# Patient Record
Sex: FEMALE | Race: WHITE | NOT HISPANIC OR LATINO | Employment: FULL TIME | URBAN - METROPOLITAN AREA
[De-identification: names, ages, dates, MRNs, and addresses within clinical notes are randomized per-mention and may not be internally consistent; named-entity substitution may affect disease eponyms.]

---

## 2020-05-13 ENCOUNTER — HOSPITAL ENCOUNTER (EMERGENCY)
Facility: HOSPITAL | Age: 40
Discharge: HOME | End: 2020-05-13
Attending: EMERGENCY MEDICINE | Admitting: EMERGENCY MEDICINE

## 2020-05-13 ENCOUNTER — APPOINTMENT (OUTPATIENT)
Dept: CT IMAGING | Facility: HOSPITAL | Age: 40
End: 2020-05-13

## 2020-05-13 VITALS
RESPIRATION RATE: 13 BRPM | OXYGEN SATURATION: 100 % | HEIGHT: 65 IN | DIASTOLIC BLOOD PRESSURE: 68 MMHG | HEART RATE: 55 BPM | WEIGHT: 149.91 LBS | BODY MASS INDEX: 24.98 KG/M2 | SYSTOLIC BLOOD PRESSURE: 110 MMHG | TEMPERATURE: 97.5 F

## 2020-05-13 DIAGNOSIS — N20.1 URETEROLITHIASIS: Primary | ICD-10-CM

## 2020-05-13 DIAGNOSIS — R10.9 FLANK PAIN: ICD-10-CM

## 2020-05-13 LAB
ALBUMIN SERPL-MCNC: 4.7 G/DL (ref 3.5–5.2)
ALBUMIN/GLOB SERPL: 1.9 G/DL
ALP SERPL-CCNC: 70 U/L (ref 39–117)
ALT SERPL W P-5'-P-CCNC: 11 U/L (ref 1–33)
ANION GAP SERPL CALCULATED.3IONS-SCNC: 14 MMOL/L (ref 5–15)
AST SERPL-CCNC: 20 U/L (ref 1–32)
B-HCG UR QL: NEGATIVE
BACTERIA UR QL AUTO: ABNORMAL /HPF
BASOPHILS # BLD AUTO: 0 10*3/MM3 (ref 0–0.2)
BASOPHILS NFR BLD AUTO: 0.2 % (ref 0–1.5)
BILIRUB SERPL-MCNC: 0.3 MG/DL (ref 0.2–1.2)
BILIRUB UR QL STRIP: NEGATIVE
BUN BLD-MCNC: 19 MG/DL (ref 6–20)
BUN/CREAT SERPL: 21.6 (ref 7–25)
CALCIUM SPEC-SCNC: 9.3 MG/DL (ref 8.6–10.5)
CHLORIDE SERPL-SCNC: 105 MMOL/L (ref 98–107)
CLARITY UR: ABNORMAL
CO2 SERPL-SCNC: 22 MMOL/L (ref 22–29)
COLOR UR: ABNORMAL
CREAT BLD-MCNC: 0.88 MG/DL (ref 0.57–1)
DEPRECATED RDW RBC AUTO: 41.6 FL (ref 37–54)
EOSINOPHIL # BLD AUTO: 0.1 10*3/MM3 (ref 0–0.4)
EOSINOPHIL NFR BLD AUTO: 0.9 % (ref 0.3–6.2)
ERYTHROCYTE [DISTWIDTH] IN BLOOD BY AUTOMATED COUNT: 13 % (ref 12.3–15.4)
GFR SERPL CREATININE-BSD FRML MDRD: 71 ML/MIN/1.73
GLOBULIN UR ELPH-MCNC: 2.5 GM/DL
GLUCOSE BLD-MCNC: 111 MG/DL (ref 65–99)
GLUCOSE UR STRIP-MCNC: NEGATIVE MG/DL
HCT VFR BLD AUTO: 39.8 % (ref 34–46.6)
HGB BLD-MCNC: 14.3 G/DL (ref 12–15.9)
HGB UR QL STRIP.AUTO: ABNORMAL
HOLD SPECIMEN: NORMAL
HYALINE CASTS UR QL AUTO: ABNORMAL /LPF
KETONES UR QL STRIP: NEGATIVE
LEUKOCYTE ESTERASE UR QL STRIP.AUTO: ABNORMAL
LIPASE SERPL-CCNC: 24 U/L (ref 13–60)
LYMPHOCYTES # BLD AUTO: 2 10*3/MM3 (ref 0.7–3.1)
LYMPHOCYTES NFR BLD AUTO: 21 % (ref 19.6–45.3)
MCH RBC QN AUTO: 32.5 PG (ref 26.6–33)
MCHC RBC AUTO-ENTMCNC: 35.9 G/DL (ref 31.5–35.7)
MCV RBC AUTO: 90.6 FL (ref 79–97)
MONOCYTES # BLD AUTO: 0.5 10*3/MM3 (ref 0.1–0.9)
MONOCYTES NFR BLD AUTO: 5.6 % (ref 5–12)
NEUTROPHILS # BLD AUTO: 7 10*3/MM3 (ref 1.7–7)
NEUTROPHILS NFR BLD AUTO: 72.3 % (ref 42.7–76)
NITRITE UR QL STRIP: NEGATIVE
NRBC BLD AUTO-RTO: 0 /100 WBC (ref 0–0.2)
PH UR STRIP.AUTO: 6.5 [PH] (ref 5–8)
PLATELET # BLD AUTO: 197 10*3/MM3 (ref 140–450)
PMV BLD AUTO: 7.7 FL (ref 6–12)
POTASSIUM BLD-SCNC: 3.3 MMOL/L (ref 3.5–5.2)
PROT SERPL-MCNC: 7.2 G/DL (ref 6–8.5)
PROT UR QL STRIP: ABNORMAL
RBC # BLD AUTO: 4.4 10*6/MM3 (ref 3.77–5.28)
RBC # UR: ABNORMAL /HPF
REF LAB TEST METHOD: ABNORMAL
SODIUM BLD-SCNC: 141 MMOL/L (ref 136–145)
SP GR UR STRIP: 1.02 (ref 1–1.03)
SQUAMOUS #/AREA URNS HPF: ABNORMAL /HPF
UROBILINOGEN UR QL STRIP: ABNORMAL
WBC NRBC COR # BLD: 9.7 10*3/MM3 (ref 3.4–10.8)
WBC UR QL AUTO: ABNORMAL /HPF
WHOLE BLOOD HOLD SPECIMEN: NORMAL

## 2020-05-13 PROCEDURE — 25010000002 ONDANSETRON PER 1 MG: Performed by: EMERGENCY MEDICINE

## 2020-05-13 PROCEDURE — 81025 URINE PREGNANCY TEST: CPT | Performed by: EMERGENCY MEDICINE

## 2020-05-13 PROCEDURE — 85025 COMPLETE CBC W/AUTO DIFF WBC: CPT | Performed by: EMERGENCY MEDICINE

## 2020-05-13 PROCEDURE — 96375 TX/PRO/DX INJ NEW DRUG ADDON: CPT

## 2020-05-13 PROCEDURE — 80053 COMPREHEN METABOLIC PANEL: CPT | Performed by: EMERGENCY MEDICINE

## 2020-05-13 PROCEDURE — 74176 CT ABD & PELVIS W/O CONTRAST: CPT

## 2020-05-13 PROCEDURE — 96374 THER/PROPH/DIAG INJ IV PUSH: CPT

## 2020-05-13 PROCEDURE — 83690 ASSAY OF LIPASE: CPT | Performed by: EMERGENCY MEDICINE

## 2020-05-13 PROCEDURE — 81001 URINALYSIS AUTO W/SCOPE: CPT | Performed by: EMERGENCY MEDICINE

## 2020-05-13 PROCEDURE — 99284 EMERGENCY DEPT VISIT MOD MDM: CPT

## 2020-05-13 PROCEDURE — 25010000002 HYDROMORPHONE PER 4 MG: Performed by: EMERGENCY MEDICINE

## 2020-05-13 PROCEDURE — 25010000002 KETOROLAC TROMETHAMINE PER 15 MG: Performed by: EMERGENCY MEDICINE

## 2020-05-13 RX ORDER — SODIUM CHLORIDE 0.9 % (FLUSH) 0.9 %
10 SYRINGE (ML) INJECTION AS NEEDED
Status: DISCONTINUED | OUTPATIENT
Start: 2020-05-13 | End: 2020-05-14 | Stop reason: HOSPADM

## 2020-05-13 RX ORDER — ONDANSETRON 2 MG/ML
4 INJECTION INTRAMUSCULAR; INTRAVENOUS ONCE
Status: COMPLETED | OUTPATIENT
Start: 2020-05-13 | End: 2020-05-13

## 2020-05-13 RX ORDER — HYDROCODONE BITARTRATE AND ACETAMINOPHEN 7.5; 325 MG/1; MG/1
1 TABLET ORAL EVERY 4 HOURS PRN
Qty: 10 TABLET | Refills: 0 | Status: ON HOLD | OUTPATIENT
Start: 2020-05-13 | End: 2020-05-18 | Stop reason: SDUPTHER

## 2020-05-13 RX ORDER — CEFDINIR 300 MG/1
300 CAPSULE ORAL 2 TIMES DAILY
Qty: 14 CAPSULE | Refills: 0 | Status: SHIPPED | OUTPATIENT
Start: 2020-05-13 | End: 2020-05-18 | Stop reason: HOSPADM

## 2020-05-13 RX ORDER — KETOROLAC TROMETHAMINE 15 MG/ML
15 INJECTION, SOLUTION INTRAMUSCULAR; INTRAVENOUS ONCE
Status: COMPLETED | OUTPATIENT
Start: 2020-05-13 | End: 2020-05-13

## 2020-05-13 RX ORDER — TAMSULOSIN HYDROCHLORIDE 0.4 MG/1
1 CAPSULE ORAL NIGHTLY
Qty: 10 CAPSULE | Refills: 0 | Status: SHIPPED | OUTPATIENT
Start: 2020-05-13 | End: 2020-05-18 | Stop reason: HOSPADM

## 2020-05-13 RX ORDER — SODIUM CHLORIDE 9 MG/ML
500 INJECTION, SOLUTION INTRAVENOUS CONTINUOUS
Status: DISCONTINUED | OUTPATIENT
Start: 2020-05-13 | End: 2020-05-14 | Stop reason: HOSPADM

## 2020-05-13 RX ORDER — HYDROMORPHONE HCL 110MG/55ML
0.5 PATIENT CONTROLLED ANALGESIA SYRINGE INTRAVENOUS ONCE
Status: COMPLETED | OUTPATIENT
Start: 2020-05-13 | End: 2020-05-13

## 2020-05-13 RX ORDER — ONDANSETRON 4 MG/1
4 TABLET, ORALLY DISINTEGRATING ORAL 4 TIMES DAILY PRN
Qty: 15 TABLET | Refills: 0 | Status: SHIPPED | OUTPATIENT
Start: 2020-05-13

## 2020-05-13 RX ADMIN — SODIUM CHLORIDE 500 ML/HR: 900 INJECTION, SOLUTION INTRAVENOUS at 21:37

## 2020-05-13 RX ADMIN — KETOROLAC TROMETHAMINE 15 MG: 15 INJECTION, SOLUTION INTRAMUSCULAR; INTRAVENOUS at 20:27

## 2020-05-13 RX ADMIN — HYDROMORPHONE HYDROCHLORIDE 0.5 MG: 2 INJECTION, SOLUTION INTRAMUSCULAR; INTRAVENOUS; SUBCUTANEOUS at 20:27

## 2020-05-13 RX ADMIN — ONDANSETRON 4 MG: 2 INJECTION INTRAMUSCULAR; INTRAVENOUS at 20:27

## 2020-05-14 NOTE — ED NOTES
Pt scheduled for Esophageal Motility study Pt stated this morning she began having pain starting from her lower flank area radiating to her abdomin. Pt denies any vomiting. Patient stated she's having 9/10 in pain.     Varinder Pereira, RN  05/13/20 2010

## 2020-05-14 NOTE — DISCHARGE INSTRUCTIONS
Rest, drink plenty of fluids, strain urine, return promptly for increased pain, fever, persistent vomiting or any other concerns.  Call urology in the morning to schedule follow-up appointment

## 2020-05-14 NOTE — ED NOTES
Pt stated her pain had subsided. Pt tolerated medications well.     Varinder Pereira, RN  05/13/20 1339

## 2020-05-14 NOTE — ED PROVIDER NOTES
"Subjective   History of Present Illness  Right flank pain  40-year-old female complains of right flank pain that started this morning and has progressed in intensity throughout the day.  She states she has radiating pain from the flank to the right groin.  She has had some difficulty urinating today.  She reports no fevers or chills she has had nausea.  States she has had a kidney stone in the past and this feels similar.  She denies trauma or any relieving or exacerbating factors.  Review of Systems   Constitutional: Negative.    HENT: Negative.    Eyes: Negative.    Respiratory: Negative.    Cardiovascular: Negative.    Gastrointestinal: Positive for abdominal pain and nausea.   Genitourinary: Positive for difficulty urinating and flank pain.   Musculoskeletal: Positive for back pain.   Skin: Negative.    Neurological: Negative.    Hematological: Negative.    Psychiatric/Behavioral: Negative.        History reviewed. No pertinent past medical history.  Kidney stone  No Known Allergies    History reviewed. No pertinent surgical history.    No family history on file.    Social History     Socioeconomic History   • Marital status:      Spouse name: Not on file   • Number of children: Not on file   • Years of education: Not on file   • Highest education level: Not on file       Prior to Admission medications    Not on File     /67 (BP Location: Right arm, Patient Position: Sitting)   Pulse 54   Temp 97.6 °F (36.4 °C) (Oral)   Resp 15   Ht 165.1 cm (65\")   Wt 68 kg (149 lb 14.6 oz)   SpO2 100%   Breastfeeding No   BMI 24.95 kg/m²   I examined the patient using the appropriate personal protective equipment.        Objective   Physical Exam  General: Well-developed well-appearing, no acute distress, alert and appropriate  Eyes: Pupils round, sclera nonicteric  HEENT: Mucous membranes moist, no mucosal swelling  Neck: Supple, no nuchal rigidity, no lymphadenopathy  Respirations: Respirations " nonlabored, equal breath sounds bilaterally, clear lungs  Heart regular rate and rhythm   Abdomen soft, mildly tender uppercase right hemiabdomen, no rebound or guarding, nondistended, no hepatosplenomegaly, no hernia, no mass, normal bowel sounds, right CVA tenderness  Extremities no clubbing cyanosis or edema, calves are symmetric and nontender  Neuro cranial nerves grossly intact, no focal limb deficits  Psych oriented, pleasant affect  Skin no rash, brisk cap refill  Procedures           ED Course      Results for orders placed or performed during the hospital encounter of 05/13/20   Comprehensive Metabolic Panel   Result Value Ref Range    Glucose 111 (H) 65 - 99 mg/dL    BUN 19 6 - 20 mg/dL    Creatinine 0.88 0.57 - 1.00 mg/dL    Sodium 141 136 - 145 mmol/L    Potassium 3.3 (L) 3.5 - 5.2 mmol/L    Chloride 105 98 - 107 mmol/L    CO2 22.0 22.0 - 29.0 mmol/L    Calcium 9.3 8.6 - 10.5 mg/dL    Total Protein 7.2 6.0 - 8.5 g/dL    Albumin 4.70 3.50 - 5.20 g/dL    ALT (SGPT) 11 1 - 33 U/L    AST (SGOT) 20 1 - 32 U/L    Alkaline Phosphatase 70 39 - 117 U/L    Total Bilirubin 0.3 0.2 - 1.2 mg/dL    eGFR Non African Amer 71 >60 mL/min/1.73    Globulin 2.5 gm/dL    A/G Ratio 1.9 g/dL    BUN/Creatinine Ratio 21.6 7.0 - 25.0    Anion Gap 14.0 5.0 - 15.0 mmol/L   Lipase   Result Value Ref Range    Lipase 24 13 - 60 U/L   Pregnancy, Urine - Urine, Clean Catch   Result Value Ref Range    HCG, Urine QL Negative Negative   Urinalysis With Culture If Indicated - Urine, Clean Catch   Result Value Ref Range    Color, UA Dark Yellow (A) Yellow, Straw    Appearance, UA Cloudy (A) Clear    pH, UA 6.5 5.0 - 8.0    Specific Gravity, UA 1.020 1.005 - 1.030    Glucose, UA Negative Negative    Ketones, UA Negative Negative    Bilirubin, UA Negative Negative    Blood, UA Large (3+) (A) Negative    Protein, UA Trace (A) Negative    Leuk Esterase, UA Small (1+) (A) Negative    Nitrite, UA Negative Negative    Urobilinogen, UA 0.2 E.U./dL  0.2 - 1.0 E.U./dL   CBC Auto Differential   Result Value Ref Range    WBC 9.70 3.40 - 10.80 10*3/mm3    RBC 4.40 3.77 - 5.28 10*6/mm3    Hemoglobin 14.3 12.0 - 15.9 g/dL    Hematocrit 39.8 34.0 - 46.6 %    MCV 90.6 79.0 - 97.0 fL    MCH 32.5 26.6 - 33.0 pg    MCHC 35.9 (H) 31.5 - 35.7 g/dL    RDW 13.0 12.3 - 15.4 %    RDW-SD 41.6 37.0 - 54.0 fl    MPV 7.7 6.0 - 12.0 fL    Platelets 197 140 - 450 10*3/mm3    Neutrophil % 72.3 42.7 - 76.0 %    Lymphocyte % 21.0 19.6 - 45.3 %    Monocyte % 5.6 5.0 - 12.0 %    Eosinophil % 0.9 0.3 - 6.2 %    Basophil % 0.2 0.0 - 1.5 %    Neutrophils, Absolute 7.00 1.70 - 7.00 10*3/mm3    Lymphocytes, Absolute 2.00 0.70 - 3.10 10*3/mm3    Monocytes, Absolute 0.50 0.10 - 0.90 10*3/mm3    Eosinophils, Absolute 0.10 0.00 - 0.40 10*3/mm3    Basophils, Absolute 0.00 0.00 - 0.20 10*3/mm3    nRBC 0.0 0.0 - 0.2 /100 WBC   Urinalysis, Microscopic Only - Urine, Clean Catch   Result Value Ref Range    RBC, UA Too Numerous to Count (A) None Seen /HPF    WBC, UA 0-2 (A) None Seen /HPF    Bacteria, UA None Seen None Seen /HPF    Squamous Epithelial Cells, UA 3-6 (A) None Seen, 0-2 /HPF    Hyaline Casts, UA None Seen None Seen /LPF    Methodology Manual Light Microscopy      Ct Abdomen Pelvis Without Contrast    Result Date: 5/13/2020  1.Numerous bilateral renal calculi with moderate right hydronephrosis and hydroureter. The ureters dilated into the pelvis, but evaluation of the distal ureters is severely limited due to artifact from patient's bilateral hip arthroplasties. Several calcifications are seen in the right pelvis, one of which is presumed to be related to an obstructing calculus given the above findings. 2.No left hydronephrosis. There is a suspected left renal cyst. 3.Mild prominence of retroperitoneal lymph nodes, nonspecific. 4.No other definite acute abnormality is seen on this limited noncontrast study.    Electronically Signed By-DR. Kory Melgar MD On:5/13/2020 9:23 PM This report  was finalized on [id number] by DR. Kory Melgar MD.                                         MDM  Patient presents with right flank pain suggestive of renal colic.  She does have findings consistent with ureteral lithiasis and some obstructive uropathy.  However kidney function is normal.  She responded well to analgesia and was resting more comfortably on reexamination.  She was advised the finding as well as the limitation to CT scan due to the artifact.  She is referred to urology.  She was given warning signs for return.  She was prescribed Flomax and Norco Zofran and Omnicef.  Inspect report ordered.  Final diagnoses:   Ureterolithiasis   Flank pain            Roc Charles MD  05/13/20 2127

## 2020-05-17 ENCOUNTER — HOSPITAL ENCOUNTER (OUTPATIENT)
Facility: HOSPITAL | Age: 40
Discharge: HOME | End: 2020-05-18
Attending: INTERNAL MEDICINE | Admitting: UROLOGY

## 2020-05-17 ENCOUNTER — ANESTHESIA EVENT (OUTPATIENT)
Dept: PERIOP | Facility: HOSPITAL | Age: 40
End: 2020-05-17

## 2020-05-17 ENCOUNTER — ANESTHESIA (OUTPATIENT)
Dept: PERIOP | Facility: HOSPITAL | Age: 40
End: 2020-05-17

## 2020-05-17 DIAGNOSIS — N20.1 URETEROLITHIASIS: Primary | ICD-10-CM

## 2020-05-17 DIAGNOSIS — R10.9 FLANK PAIN: ICD-10-CM

## 2020-05-17 DIAGNOSIS — N13.30 HYDROURETERONEPHROSIS: ICD-10-CM

## 2020-05-17 DIAGNOSIS — N20.1 URETERAL STONE: ICD-10-CM

## 2020-05-17 DIAGNOSIS — R11.2 NON-INTRACTABLE VOMITING WITH NAUSEA, UNSPECIFIED VOMITING TYPE: ICD-10-CM

## 2020-05-17 DIAGNOSIS — R10.9 RIGHT FLANK PAIN: ICD-10-CM

## 2020-05-17 LAB
ANION GAP SERPL CALCULATED.3IONS-SCNC: 14 MMOL/L (ref 5–15)
BACTERIA UR QL AUTO: ABNORMAL /HPF
BASOPHILS # BLD AUTO: 0 10*3/MM3 (ref 0–0.2)
BASOPHILS NFR BLD AUTO: 0.2 % (ref 0–1.5)
BILIRUB UR QL STRIP: NEGATIVE
BUN BLD-MCNC: 16 MG/DL (ref 6–20)
BUN/CREAT SERPL: 12.5 (ref 7–25)
CALCIUM SPEC-SCNC: 9 MG/DL (ref 8.6–10.5)
CHLORIDE SERPL-SCNC: 101 MMOL/L (ref 98–107)
CLARITY UR: ABNORMAL
CO2 SERPL-SCNC: 21 MMOL/L (ref 22–29)
COLOR UR: YELLOW
CREAT BLD-MCNC: 1.28 MG/DL (ref 0.57–1)
DEPRECATED RDW RBC AUTO: 40.7 FL (ref 37–54)
EOSINOPHIL # BLD AUTO: 0.1 10*3/MM3 (ref 0–0.4)
EOSINOPHIL NFR BLD AUTO: 0.9 % (ref 0.3–6.2)
ERYTHROCYTE [DISTWIDTH] IN BLOOD BY AUTOMATED COUNT: 12.9 % (ref 12.3–15.4)
GFR SERPL CREATININE-BSD FRML MDRD: 46 ML/MIN/1.73
GLUCOSE BLD-MCNC: 106 MG/DL (ref 65–99)
GLUCOSE UR STRIP-MCNC: NEGATIVE MG/DL
HCT VFR BLD AUTO: 37.3 % (ref 34–46.6)
HGB BLD-MCNC: 13.2 G/DL (ref 12–15.9)
HGB UR QL STRIP.AUTO: ABNORMAL
HYALINE CASTS UR QL AUTO: ABNORMAL /LPF
KETONES UR QL STRIP: ABNORMAL
LEUKOCYTE ESTERASE UR QL STRIP.AUTO: ABNORMAL
LYMPHOCYTES # BLD AUTO: 0.9 10*3/MM3 (ref 0.7–3.1)
LYMPHOCYTES NFR BLD AUTO: 10.7 % (ref 19.6–45.3)
MCH RBC QN AUTO: 32.2 PG (ref 26.6–33)
MCHC RBC AUTO-ENTMCNC: 35.4 G/DL (ref 31.5–35.7)
MCV RBC AUTO: 90.7 FL (ref 79–97)
MONOCYTES # BLD AUTO: 0.7 10*3/MM3 (ref 0.1–0.9)
MONOCYTES NFR BLD AUTO: 9.3 % (ref 5–12)
NEUTROPHILS # BLD AUTO: 6.3 10*3/MM3 (ref 1.7–7)
NEUTROPHILS NFR BLD AUTO: 78.9 % (ref 42.7–76)
NITRITE UR QL STRIP: NEGATIVE
NRBC BLD AUTO-RTO: 0 /100 WBC (ref 0–0.2)
PH UR STRIP.AUTO: 6.5 [PH] (ref 5–8)
PLATELET # BLD AUTO: 183 10*3/MM3 (ref 140–450)
PMV BLD AUTO: 8.1 FL (ref 6–12)
POTASSIUM BLD-SCNC: 3.5 MMOL/L (ref 3.5–5.2)
PROT UR QL STRIP: NEGATIVE
RBC # BLD AUTO: 4.11 10*6/MM3 (ref 3.77–5.28)
RBC # UR: ABNORMAL /HPF
REF LAB TEST METHOD: ABNORMAL
SODIUM BLD-SCNC: 136 MMOL/L (ref 136–145)
SP GR UR STRIP: 1.02 (ref 1–1.03)
SQUAMOUS #/AREA URNS HPF: ABNORMAL /HPF
UROBILINOGEN UR QL STRIP: ABNORMAL
WBC NRBC COR # BLD: 8 10*3/MM3 (ref 3.4–10.8)
WBC UR QL AUTO: ABNORMAL /HPF

## 2020-05-17 PROCEDURE — G0378 HOSPITAL OBSERVATION PER HR: HCPCS

## 2020-05-17 PROCEDURE — C1769 GUIDE WIRE: HCPCS | Performed by: UROLOGY

## 2020-05-17 PROCEDURE — 99220 PR INITIAL OBSERVATION CARE/DAY 70 MINUTES: CPT | Performed by: INTERNAL MEDICINE

## 2020-05-17 PROCEDURE — 25010000003 MEPERIDINE PER 100 MG: Performed by: ANESTHESIOLOGY

## 2020-05-17 PROCEDURE — 96375 TX/PRO/DX INJ NEW DRUG ADDON: CPT

## 2020-05-17 PROCEDURE — C1758 CATHETER, URETERAL: HCPCS | Performed by: UROLOGY

## 2020-05-17 PROCEDURE — 80048 BASIC METABOLIC PNL TOTAL CA: CPT | Performed by: NURSE PRACTITIONER

## 2020-05-17 PROCEDURE — 25010000002 MORPHINE PER 10 MG: Performed by: NURSE PRACTITIONER

## 2020-05-17 PROCEDURE — C2617 STENT, NON-COR, TEM W/O DEL: HCPCS | Performed by: UROLOGY

## 2020-05-17 PROCEDURE — 99284 EMERGENCY DEPT VISIT MOD MDM: CPT

## 2020-05-17 PROCEDURE — 87086 URINE CULTURE/COLONY COUNT: CPT | Performed by: NURSE PRACTITIONER

## 2020-05-17 PROCEDURE — 25010000002 PROPOFOL 200 MG/20ML EMULSION: Performed by: ANESTHESIOLOGY

## 2020-05-17 PROCEDURE — 25010000002 KETOROLAC TROMETHAMINE PER 15 MG: Performed by: NURSE PRACTITIONER

## 2020-05-17 PROCEDURE — 96374 THER/PROPH/DIAG INJ IV PUSH: CPT

## 2020-05-17 PROCEDURE — 81001 URINALYSIS AUTO W/SCOPE: CPT | Performed by: NURSE PRACTITIONER

## 2020-05-17 PROCEDURE — 25010000002 FENTANYL CITRATE (PF) 100 MCG/2ML SOLUTION: Performed by: ANESTHESIOLOGY

## 2020-05-17 PROCEDURE — 85025 COMPLETE CBC W/AUTO DIFF WBC: CPT | Performed by: NURSE PRACTITIONER

## 2020-05-17 PROCEDURE — 82365 CALCULUS SPECTROSCOPY: CPT | Performed by: UROLOGY

## 2020-05-17 PROCEDURE — 25010000002 MIDAZOLAM PER 1 MG: Performed by: ANESTHESIOLOGY

## 2020-05-17 PROCEDURE — 25010000002 ONDANSETRON PER 1 MG: Performed by: NURSE PRACTITIONER

## 2020-05-17 DEVICE — URETERAL STENT
Type: IMPLANTABLE DEVICE | Site: URETER | Status: FUNCTIONAL
Brand: PERCUFLEX™ PLUS

## 2020-05-17 RX ORDER — ACETAMINOPHEN 650 MG/1
650 SUPPOSITORY RECTAL ONCE AS NEEDED
Status: DISCONTINUED | OUTPATIENT
Start: 2020-05-17 | End: 2020-05-17 | Stop reason: HOSPADM

## 2020-05-17 RX ORDER — NALOXONE HCL 0.4 MG/ML
0.1 VIAL (ML) INJECTION
Status: DISCONTINUED | OUTPATIENT
Start: 2020-05-17 | End: 2020-05-18 | Stop reason: HOSPADM

## 2020-05-17 RX ORDER — ONDANSETRON 4 MG/1
4 TABLET, FILM COATED ORAL EVERY 6 HOURS PRN
Status: DISCONTINUED | OUTPATIENT
Start: 2020-05-17 | End: 2020-05-18 | Stop reason: HOSPADM

## 2020-05-17 RX ORDER — ACETAMINOPHEN 325 MG/1
650 TABLET ORAL EVERY 4 HOURS PRN
Status: DISCONTINUED | OUTPATIENT
Start: 2020-05-17 | End: 2020-05-18 | Stop reason: HOSPADM

## 2020-05-17 RX ORDER — SODIUM CHLORIDE 9 MG/ML
100 INJECTION, SOLUTION INTRAVENOUS CONTINUOUS
Status: DISCONTINUED | OUTPATIENT
Start: 2020-05-17 | End: 2020-05-18 | Stop reason: HOSPADM

## 2020-05-17 RX ORDER — ONDANSETRON 2 MG/ML
4 INJECTION INTRAMUSCULAR; INTRAVENOUS ONCE
Status: COMPLETED | OUTPATIENT
Start: 2020-05-17 | End: 2020-05-17

## 2020-05-17 RX ORDER — PROMETHAZINE HYDROCHLORIDE 25 MG/1
25 TABLET ORAL ONCE AS NEEDED
Status: DISCONTINUED | OUTPATIENT
Start: 2020-05-17 | End: 2020-05-17 | Stop reason: HOSPADM

## 2020-05-17 RX ORDER — SODIUM CHLORIDE 0.9 % (FLUSH) 0.9 %
10 SYRINGE (ML) INJECTION AS NEEDED
Status: DISCONTINUED | OUTPATIENT
Start: 2020-05-17 | End: 2020-05-18 | Stop reason: HOSPADM

## 2020-05-17 RX ORDER — SODIUM CHLORIDE 0.9 % (FLUSH) 0.9 %
10 SYRINGE (ML) INJECTION EVERY 12 HOURS SCHEDULED
Status: DISCONTINUED | OUTPATIENT
Start: 2020-05-17 | End: 2020-05-18 | Stop reason: HOSPADM

## 2020-05-17 RX ORDER — MEPERIDINE HYDROCHLORIDE 25 MG/ML
12.5 INJECTION INTRAMUSCULAR; INTRAVENOUS; SUBCUTANEOUS
Status: DISCONTINUED | OUTPATIENT
Start: 2020-05-17 | End: 2020-05-17 | Stop reason: HOSPADM

## 2020-05-17 RX ORDER — HYDROCODONE BITARTRATE AND ACETAMINOPHEN 7.5; 325 MG/1; MG/1
2 TABLET ORAL EVERY 4 HOURS PRN
Status: DISCONTINUED | OUTPATIENT
Start: 2020-05-17 | End: 2020-05-18 | Stop reason: HOSPADM

## 2020-05-17 RX ORDER — KETOROLAC TROMETHAMINE 15 MG/ML
15 INJECTION, SOLUTION INTRAMUSCULAR; INTRAVENOUS ONCE
Status: COMPLETED | OUTPATIENT
Start: 2020-05-17 | End: 2020-05-17

## 2020-05-17 RX ORDER — ATROPA BELLADONNA AND OPIUM 16.2; 3 MG/1; MG/1
30 SUPPOSITORY RECTAL DAILY PRN
Status: DISCONTINUED | OUTPATIENT
Start: 2020-05-17 | End: 2020-05-18 | Stop reason: HOSPADM

## 2020-05-17 RX ORDER — ACETAMINOPHEN 650 MG/1
650 SUPPOSITORY RECTAL EVERY 4 HOURS PRN
Status: DISCONTINUED | OUTPATIENT
Start: 2020-05-17 | End: 2020-05-18 | Stop reason: HOSPADM

## 2020-05-17 RX ORDER — CHOLECALCIFEROL (VITAMIN D3) 125 MCG
5 CAPSULE ORAL NIGHTLY PRN
Status: DISCONTINUED | OUTPATIENT
Start: 2020-05-17 | End: 2020-05-18 | Stop reason: HOSPADM

## 2020-05-17 RX ORDER — PROPOFOL 10 MG/ML
INJECTION, EMULSION INTRAVENOUS AS NEEDED
Status: DISCONTINUED | OUTPATIENT
Start: 2020-05-17 | End: 2020-05-17 | Stop reason: SURG

## 2020-05-17 RX ORDER — ONDANSETRON 2 MG/ML
4 INJECTION INTRAMUSCULAR; INTRAVENOUS EVERY 6 HOURS PRN
Status: DISCONTINUED | OUTPATIENT
Start: 2020-05-17 | End: 2020-05-18 | Stop reason: HOSPADM

## 2020-05-17 RX ORDER — MORPHINE SULFATE 4 MG/ML
4 INJECTION, SOLUTION INTRAMUSCULAR; INTRAVENOUS ONCE
Status: COMPLETED | OUTPATIENT
Start: 2020-05-17 | End: 2020-05-17

## 2020-05-17 RX ORDER — MIDAZOLAM HYDROCHLORIDE 1 MG/ML
INJECTION INTRAMUSCULAR; INTRAVENOUS AS NEEDED
Status: DISCONTINUED | OUTPATIENT
Start: 2020-05-17 | End: 2020-05-17 | Stop reason: SURG

## 2020-05-17 RX ORDER — SODIUM CHLORIDE 9 MG/ML
INJECTION, SOLUTION INTRAVENOUS CONTINUOUS PRN
Status: DISCONTINUED | OUTPATIENT
Start: 2020-05-17 | End: 2020-05-17 | Stop reason: SURG

## 2020-05-17 RX ORDER — HYDROCODONE BITARTRATE AND ACETAMINOPHEN 7.5; 325 MG/1; MG/1
1 TABLET ORAL EVERY 4 HOURS PRN
Status: DISCONTINUED | OUTPATIENT
Start: 2020-05-17 | End: 2020-05-18 | Stop reason: HOSPADM

## 2020-05-17 RX ORDER — MORPHINE SULFATE 4 MG/ML
4 INJECTION, SOLUTION INTRAMUSCULAR; INTRAVENOUS
Status: DISCONTINUED | OUTPATIENT
Start: 2020-05-17 | End: 2020-05-18 | Stop reason: HOSPADM

## 2020-05-17 RX ORDER — ACETAMINOPHEN 325 MG/1
650 TABLET ORAL ONCE AS NEEDED
Status: DISCONTINUED | OUTPATIENT
Start: 2020-05-17 | End: 2020-05-17 | Stop reason: HOSPADM

## 2020-05-17 RX ORDER — KETOROLAC TROMETHAMINE 15 MG/ML
15 INJECTION, SOLUTION INTRAMUSCULAR; INTRAVENOUS EVERY 6 HOURS PRN
Status: DISCONTINUED | OUTPATIENT
Start: 2020-05-17 | End: 2020-05-17

## 2020-05-17 RX ORDER — HYDROCODONE BITARTRATE AND ACETAMINOPHEN 5; 325 MG/1; MG/1
1 TABLET ORAL ONCE AS NEEDED
Status: DISCONTINUED | OUTPATIENT
Start: 2020-05-17 | End: 2020-05-17 | Stop reason: HOSPADM

## 2020-05-17 RX ORDER — MULTIPLE VITAMINS W/ MINERALS TAB 9MG-400MCG
1 TAB ORAL DAILY
COMMUNITY

## 2020-05-17 RX ORDER — PROMETHAZINE HYDROCHLORIDE 25 MG/1
25 SUPPOSITORY RECTAL ONCE AS NEEDED
Status: DISCONTINUED | OUTPATIENT
Start: 2020-05-17 | End: 2020-05-17 | Stop reason: HOSPADM

## 2020-05-17 RX ORDER — PHENAZOPYRIDINE HYDROCHLORIDE 100 MG/1
100 TABLET, FILM COATED ORAL
Status: DISCONTINUED | OUTPATIENT
Start: 2020-05-17 | End: 2020-05-18 | Stop reason: HOSPADM

## 2020-05-17 RX ORDER — SUMATRIPTAN 25 MG/1
25 TABLET, FILM COATED ORAL
Status: DISCONTINUED | OUTPATIENT
Start: 2020-05-17 | End: 2020-05-18 | Stop reason: HOSPADM

## 2020-05-17 RX ORDER — ONDANSETRON 2 MG/ML
4 INJECTION INTRAMUSCULAR; INTRAVENOUS ONCE AS NEEDED
Status: DISCONTINUED | OUTPATIENT
Start: 2020-05-17 | End: 2020-05-17 | Stop reason: HOSPADM

## 2020-05-17 RX ORDER — LORAZEPAM 2 MG/ML
0.5 INJECTION INTRAMUSCULAR
Status: DISCONTINUED | OUTPATIENT
Start: 2020-05-17 | End: 2020-05-17 | Stop reason: HOSPADM

## 2020-05-17 RX ORDER — PROMETHAZINE HYDROCHLORIDE 25 MG/ML
12.5 INJECTION, SOLUTION INTRAMUSCULAR; INTRAVENOUS ONCE AS NEEDED
Status: DISCONTINUED | OUTPATIENT
Start: 2020-05-17 | End: 2020-05-17 | Stop reason: HOSPADM

## 2020-05-17 RX ORDER — FENTANYL CITRATE 50 UG/ML
INJECTION, SOLUTION INTRAMUSCULAR; INTRAVENOUS AS NEEDED
Status: DISCONTINUED | OUTPATIENT
Start: 2020-05-17 | End: 2020-05-17 | Stop reason: SURG

## 2020-05-17 RX ORDER — MORPHINE SULFATE 4 MG/ML
2 INJECTION, SOLUTION INTRAMUSCULAR; INTRAVENOUS
Status: DISCONTINUED | OUTPATIENT
Start: 2020-05-17 | End: 2020-05-17 | Stop reason: HOSPADM

## 2020-05-17 RX ORDER — HYDROMORPHONE HCL 110MG/55ML
0.5 PATIENT CONTROLLED ANALGESIA SYRINGE INTRAVENOUS
Status: DISCONTINUED | OUTPATIENT
Start: 2020-05-17 | End: 2020-05-18 | Stop reason: HOSPADM

## 2020-05-17 RX ORDER — FLUMAZENIL 0.1 MG/ML
0.5 INJECTION INTRAVENOUS AS NEEDED
Status: DISCONTINUED | OUTPATIENT
Start: 2020-05-17 | End: 2020-05-17 | Stop reason: HOSPADM

## 2020-05-17 RX ORDER — BISACODYL 10 MG
10 SUPPOSITORY, RECTAL RECTAL DAILY PRN
Status: DISCONTINUED | OUTPATIENT
Start: 2020-05-17 | End: 2020-05-18 | Stop reason: HOSPADM

## 2020-05-17 RX ORDER — NALOXONE HCL 0.4 MG/ML
0.4 VIAL (ML) INJECTION
Status: DISCONTINUED | OUTPATIENT
Start: 2020-05-17 | End: 2020-05-18 | Stop reason: HOSPADM

## 2020-05-17 RX ORDER — NALOXONE HCL 0.4 MG/ML
0.4 VIAL (ML) INJECTION AS NEEDED
Status: DISCONTINUED | OUTPATIENT
Start: 2020-05-17 | End: 2020-05-17 | Stop reason: HOSPADM

## 2020-05-17 RX ADMIN — SODIUM CHLORIDE 100 ML/HR: 900 INJECTION, SOLUTION INTRAVENOUS at 16:56

## 2020-05-17 RX ADMIN — PROPOFOL 100 MG: 10 INJECTION, EMULSION INTRAVENOUS at 15:10

## 2020-05-17 RX ADMIN — HYDROCODONE BITARTRATE AND ACETAMINOPHEN 1 TABLET: 7.5; 325 TABLET ORAL at 16:55

## 2020-05-17 RX ADMIN — SODIUM CHLORIDE 100 ML/HR: 0.9 INJECTION, SOLUTION INTRAVENOUS at 16:57

## 2020-05-17 RX ADMIN — FENTANYL CITRATE 100 MCG: 50 INJECTION, SOLUTION INTRAMUSCULAR; INTRAVENOUS at 15:08

## 2020-05-17 RX ADMIN — SODIUM CHLORIDE 100 ML/HR: 900 INJECTION, SOLUTION INTRAVENOUS at 21:32

## 2020-05-17 RX ADMIN — Medication 10 ML: at 20:12

## 2020-05-17 RX ADMIN — MEPERIDINE HYDROCHLORIDE 12.5 MG: 25 INJECTION INTRAMUSCULAR; INTRAVENOUS; SUBCUTANEOUS at 16:11

## 2020-05-17 RX ADMIN — PHENAZOPYRIDINE HYDROCHLORIDE 100 MG: 100 TABLET ORAL at 18:30

## 2020-05-17 RX ADMIN — PROPOFOL 50 MG: 10 INJECTION, EMULSION INTRAVENOUS at 15:13

## 2020-05-17 RX ADMIN — SODIUM CHLORIDE 1000 ML: 0.9 INJECTION, SOLUTION INTRAVENOUS at 11:45

## 2020-05-17 RX ADMIN — KETOROLAC TROMETHAMINE 15 MG: 15 INJECTION, SOLUTION INTRAMUSCULAR; INTRAVENOUS at 11:46

## 2020-05-17 RX ADMIN — MORPHINE SULFATE 4 MG: 4 INJECTION INTRAVENOUS at 11:46

## 2020-05-17 RX ADMIN — HYDROCODONE BITARTRATE AND ACETAMINOPHEN 2 TABLET: 7.5; 325 TABLET ORAL at 21:33

## 2020-05-17 RX ADMIN — PROPOFOL 50 MG: 10 INJECTION, EMULSION INTRAVENOUS at 15:16

## 2020-05-17 RX ADMIN — MEPERIDINE HYDROCHLORIDE 12.5 MG: 25 INJECTION INTRAMUSCULAR; INTRAVENOUS; SUBCUTANEOUS at 16:10

## 2020-05-17 RX ADMIN — SODIUM CHLORIDE: 0.9 INJECTION, SOLUTION INTRAVENOUS at 15:07

## 2020-05-17 RX ADMIN — CEFAZOLIN SODIUM 2 G: 1 INJECTION, POWDER, FOR SOLUTION INTRAMUSCULAR; INTRAVENOUS at 16:57

## 2020-05-17 RX ADMIN — MIDAZOLAM 2 MG: 1 INJECTION INTRAMUSCULAR; INTRAVENOUS at 15:08

## 2020-05-17 RX ADMIN — PROPOFOL 5 MG: 10 INJECTION, EMULSION INTRAVENOUS at 15:15

## 2020-05-17 RX ADMIN — PROPOFOL 5 MG: 10 INJECTION, EMULSION INTRAVENOUS at 15:20

## 2020-05-17 RX ADMIN — ONDANSETRON 4 MG: 2 INJECTION INTRAMUSCULAR; INTRAVENOUS at 11:46

## 2020-05-17 NOTE — ANESTHESIA PREPROCEDURE EVALUATION
Anesthesia Evaluation     Patient summary reviewed and Nursing notes reviewed   NPO Solid Status: > 6 hours  NPO Liquid Status: > 6 hours           Airway   Mallampati: II  TM distance: >3 FB  Neck ROM: full  No difficulty expected  Dental - normal exam     Pulmonary - negative pulmonary ROS and normal exam    breath sounds clear to auscultation  Cardiovascular - negative cardio ROS and normal exam    ECG reviewed  Rhythm: regular  Rate: normal        Neuro/Psych- negative ROS  GI/Hepatic/Renal/Endo - negative ROS     Musculoskeletal (-) negative ROS    Abdominal  - normal exam    Abdomen: soft.  Bowel sounds: normal.   Substance History - negative use     OB/GYN negative ob/gyn ROS         Other - negative ROS                       Anesthesia Plan    ASA 2 - emergent     general and spinal     intravenous induction     Anesthetic plan, all risks, benefits, and alternatives have been provided, discussed and informed consent has been obtained with: patient.  Use of blood products discussed with patient .

## 2020-05-17 NOTE — CONSULTS
Urology Consult Note    Patient:Lynette Bray :1980  Room:[facility] MAIN OR/MAIN OR  Admit Date2020  Age:40 y.o.     SEX:female     DOS:2020     MR:[id number]     Visit:[id number]       Attending: Ramu Jacobs MD  Referring Provider: Dr Jacobs  Reason for Consultation: Right ureteral calculi    Patient Care Team:  Mata Quinteros MD as PCP - General (Family Medicine)    Chief complaint right flank pain    Subjective .     History of present illness: Patient is a 40-year-old white female with a history of stones who developed right flank pain a couple days ago the radiating down to her groin area.  It was a 8 on a scale of 10.  No associated fevers.  I reviewed her CT scan which revealed some possible right distal ureteral stones which were hard to see secondary to her hip prosthesis.  She did however have right hydronephrosis.  She was hoping we could take care of the stones today    Review of Systems  12 point review of systems were reviewed and are negative except for what is in HPI.    History  History reviewed. No pertinent past medical history.  History reviewed. No pertinent surgical history.  Social History     Socioeconomic History   • Marital status:      Spouse name: Not on file   • Number of children: Not on file   • Years of education: Not on file   • Highest education level: Not on file   Tobacco Use   • Smoking status: Never Smoker   • Smokeless tobacco: Never Used     History reviewed. No pertinent family history.  No Known Allergies  Prior to Admission medications    Medication Sig Start Date End Date Taking? Authorizing Provider   cefdinir (OMNICEF) 300 MG capsule Take 1 capsule by mouth 2 (Two) Times a Day. 20  Yes Roc Charles MD   HYDROcodone-acetaminophen (NORCO) 7.5-325 MG per tablet Take 1 tablet by mouth Every 4 (Four) Hours As Needed for Moderate Pain . 20  Yes Roc Charles MD   Multiple Vitamins-Minerals (MULTIVITAMIN WITH MINERALS) tablet  tablet Take 1 tablet by mouth Daily.   Yes ProviderTripp MD   ondansetron ODT (ZOFRAN-ODT) 4 MG disintegrating tablet Place 1 tablet on the tongue 4 (Four) Times a Day As Needed for Nausea or Vomiting. 20  Yes Roc Charles MD   tamsulosin (FLOMAX) 0.4 MG capsule 24 hr capsule Take 1 capsule by mouth Every Night. 20  Yes Roc Charles MD   SUMATRIPTAN SUCCINATE PO Take  by mouth Every 2 (Two) Hours As Needed for Migraine. Take one tablet at onset of headache. May repeat dose one time in 2 hours if headache not relieved.    ProviderTripp MD         Objective     tMax 24 hours:  Temp (24hrs), Av °F (36.7 °C), Min:97.8 °F (36.6 °C), Max:98.1 °F (36.7 °C)    Vital Sign Ranges:  Temp:  [97.8 °F (36.6 °C)-98.1 °F (36.7 °C)] 97.8 °F (36.6 °C)  Heart Rate:  [70-90] 71  Resp:  [12-16] 12  BP: (100-135)/(51-82) 124/68  Intake and Output Last 3 Shifts:  No intake/output data recorded.      Physical Exam:     General Appearance:    Alert, cooperative, in no acute distress   Head:    Normocephalic, without obvious abnormality, atraumatic   Eyes:            Lids and lashes normal, conjunctivae and sclerae normal, no   icterus, no pallor, corneas clear, PERRLA   Ears:    Ears appear intact with no abnormalities noted   Throat:   No oral lesions, no thrush, oral mucosa moist   Neck:   No adenopathy, supple, trachea midline, no thyromegaly, no     carotid bruit, no JVD   Back:     No kyphosis present, no scoliosis present, no skin lesions,       erythema or scars, no tenderness to percussion or                   palpation,   range of motion normal   Lungs:     Clear to auscultation,respirations regular, even and                   unlabored    Heart:    Regular rhythm and normal rate, normal S1 and S2, no            murmur, no gallop, no rub, no click   Breast Exam:    Deferred   Abdomen:     Normal bowel sounds, no masses, no organomegaly, soft        non-tender, non-distended, no guarding, no  rebound                 tenderness   Genitalia:    Deferred   Extremities:   Moves all extremities well, no edema, no cyanosis, no              redness   Pulses:   Pulses palpable and equal bilaterally   Skin:   No bleeding, bruising or rash   Lymph nodes:   No palpable adenopathy   Neurologic:   Cranial nerves 2 - 12 grossly intact, sensation intact, DTR        present and equal bilaterally       Results Review:     Lab Results (last 24 hours)     Procedure Component Value Units Date/Time    Basic Metabolic Panel [[id number]]  (Abnormal) Collected:  05/17/20 1145    Specimen:  Blood from Arm, Left Updated:  05/17/20 1217     Glucose 106 mg/dL      BUN 16 mg/dL      Creatinine 1.28 mg/dL      Sodium 136 mmol/L      Potassium 3.5 mmol/L      Chloride 101 mmol/L      CO2 21.0 mmol/L      Calcium 9.0 mg/dL      eGFR Non African Amer 46 mL/min/1.73      BUN/Creatinine Ratio 12.5     Anion Gap 14.0 mmol/L     Narrative:       GFR Normal >60  Chronic Kidney Disease <60  Kidney Failure <15      Urinalysis, Microscopic Only - Urine, Clean Catch [[id number]]  (Abnormal) Collected:  05/17/20 1145    Specimen:  Urine, Clean Catch Updated:  05/17/20 1159     RBC, UA 21-30 /HPF      WBC, UA 13-20 /HPF      Bacteria, UA Trace /HPF      Squamous Epithelial Cells, UA 13-20 /HPF      Hyaline Casts, UA 0-2 /LPF      Methodology Automated Microscopy    Urine Culture - Urine, Urine, Clean Catch [[id number]] Collected:  05/17/20 1145    Specimen:  Urine, Clean Catch Updated:  05/17/20 1159    CBC & Differential [[id number]] Collected:  05/17/20 1145    Specimen:  Blood from Arm, Left Updated:  05/17/20 1156    Narrative:       The following orders were created for panel order CBC & Differential.  Procedure                               Abnormality         Status                     ---------                               -----------         ------                     CBC Auto Differential[[id number]]        Abnormal            Final result                  Please view results for these tests on the individual orders.    CBC Auto Differential [[id number]]  (Abnormal) Collected:  05/17/20 1145    Specimen:  Blood from Arm, Left Updated:  05/17/20 1156     WBC 8.00 10*3/mm3      RBC 4.11 10*6/mm3      Hemoglobin 13.2 g/dL      Hematocrit 37.3 %      MCV 90.7 fL      MCH 32.2 pg      MCHC 35.4 g/dL      RDW 12.9 %      RDW-SD 40.7 fl      MPV 8.1 fL      Platelets 183 10*3/mm3      Neutrophil % 78.9 %      Lymphocyte % 10.7 %      Monocyte % 9.3 %      Eosinophil % 0.9 %      Basophil % 0.2 %      Neutrophils, Absolute 6.30 10*3/mm3      Lymphocytes, Absolute 0.90 10*3/mm3      Monocytes, Absolute 0.70 10*3/mm3      Eosinophils, Absolute 0.10 10*3/mm3      Basophils, Absolute 0.00 10*3/mm3      nRBC 0.0 /100 WBC     Urinalysis With Culture If Indicated - Urine, Clean Catch [[id number]]  (Abnormal) Collected:  05/17/20 1145    Specimen:  Urine, Clean Catch Updated:  05/17/20 1154     Color, UA Yellow     Appearance, UA Cloudy     Comment: Result checked         pH, UA 6.5     Specific Gravity, UA 1.025     Glucose, UA Negative     Ketones, UA 80 mg/dL (3+)     Bilirubin, UA Negative     Blood, UA Moderate (2+)     Protein, UA Negative     Leuk Esterase, UA Trace     Nitrite, UA Negative     Urobilinogen, UA 1.0 E.U./dL         No results found for: URINECX     Imaging Results (Last 7 Days)     ** No results found for the last 168 hours. **          Inpatient Meds:   Scheduled Meds:  enoxaparin 40 mg Subcutaneous Q24H   sodium chloride 10 mL Intravenous Q12H      Continuous Infusions:    PRN Meds:.•  [MAR Hold] acetaminophen **OR** [MAR Hold] acetaminophen  •  acetaminophen  •  bisacodyl  •  [MAR Hold] flumazenil  •  [MAR Hold] HYDROcodone-acetaminophen  •  HYDROcodone-acetaminophen  •  [MAR Hold] LORazepam  •  magnesium hydroxide  •  melatonin  •  [MAR Hold] meperidine  •  [MAR Hold] morphine  •  [MAR Hold] naloxone  •  [MAR Hold] ondansetron  •  ondansetron  **OR** ondansetron  •  [MAR Hold] promethazine **OR** [MAR Hold] promethazine **OR** [MAR Hold] promethazine **OR** [MAR Hold] promethazine  •  [COMPLETED] Insert peripheral IV **AND** [MAR Hold] sodium chloride  •  sodium chloride  •  SUMAtriptan      Assessment/Plan     Right ureteral calculi    Plan cystoscopy right ureteroscopy, stone extraction and stent placement.    Discussed with patient all risk benefits and options    I discussed the patients findings and my recommendations with patient.    Pio Muniz MD  05/17/20  15:34

## 2020-05-17 NOTE — H&P
[facility]      Patient Name: Lynette Bray  : 1980  MRN: [id number]  Primary Care Physician: Mata Quinteros MD  Date of admission: 2020    Patient Care Team:  Mata Quinteros MD as PCP - General (Family Medicine)          Subjective   History Present Illness     Chief Complaint:   Chief Complaint   Patient presents with   • Flank Pain       Right flank pain       History of Present Illness     40-year-old female with medical history significant for migraine headache.  Presented to the emergency room with complaints of right flank pain.  Stated pain started all of a sudden 5 days ago.  Pain is of moderate to severe intensity-7-10 over 10 in severity.  Radiates down the right groin with no exacerbating or relieving factor.  Had associated nausea and vomiting.  Denied any fever or chills.  Also had no dysuria or increased urinary frequency.  For the above symptom, patient initially presented to the ED 4 days ago and she was diagnosed with renal stone.  She was discharged on narcotic, antibiotics, antiemetic and Flomax and told to follow-up with a urologist as outpatient.  Per patient, symptoms has worsened and she is not able to take any medication due to increased pain, nausea with vomiting thus she represented to the emergency room today.  On-call urologist was consulted from the ED and plans Cystoscopy and possible stent placement today.    Review of Systems   Constitution: Negative for chills and fever.   HENT: Negative for congestion.    Eyes: Negative for blurred vision.   Cardiovascular: Negative for chest pain.   Respiratory: Negative for cough, hemoptysis and shortness of breath.    Endocrine: Negative for cold intolerance and heat intolerance.   Gastrointestinal: Positive for nausea and vomiting.   Genitourinary: Positive for flank pain. Negative for dysuria.   Neurological: Negative for dizziness.   Psychiatric/Behavioral: Negative for  altered mental status.           Personal History     Past Medical History: No past medical history on file.    Surgical History:    No past surgical history on file.        Family History: family history is not on file. Otherwise pertinent FHx was reviewed and unremarkable.     Social History:        Medications:  Prior to Admission medications    Medication Sig Start Date End Date Taking? Authorizing Provider   cefdinir (OMNICEF) 300 MG capsule Take 1 capsule by mouth 2 (Two) Times a Day. 5/13/20  Yes Roc Charles MD   HYDROcodone-acetaminophen (NORCO) 7.5-325 MG per tablet Take 1 tablet by mouth Every 4 (Four) Hours As Needed for Moderate Pain . 5/13/20  Yes Roc Charles MD   Multiple Vitamins-Minerals (MULTIVITAMIN WITH MINERALS) tablet tablet Take 1 tablet by mouth Daily.   Yes ProviderTripp MD   ondansetron ODT (ZOFRAN-ODT) 4 MG disintegrating tablet Place 1 tablet on the tongue 4 (Four) Times a Day As Needed for Nausea or Vomiting. 5/13/20  Yes Roc Charles MD   tamsulosin (FLOMAX) 0.4 MG capsule 24 hr capsule Take 1 capsule by mouth Every Night. 5/13/20  Yes Roc Charles MD   SUMATRIPTAN SUCCINATE PO Take  by mouth Every 2 (Two) Hours As Needed for Migraine. Take one tablet at onset of headache. May repeat dose one time in 2 hours if headache not relieved.    Provider, MD Tripp       Allergies:  No Known Allergies    Objective   Objective     Vital Signs  Temp:  [98.1 °F (36.7 °C)] 98.1 °F (36.7 °C)  Heart Rate:  [70-90] 70  Resp:  [16] 16  BP: (112-135)/(52-82) 112/52  SpO2:  [97 %-99 %] 99 %  on   ;      Body mass index is 24.47 kg/m².    Physical Exam   Constitutional: She is oriented to person, place, and time. She appears well-developed. No distress.   HENT:   Head: Normocephalic and atraumatic.   Eyes: Pupils are equal, round, and reactive to light. Conjunctivae and EOM are normal.   Neck: Neck supple.   Cardiovascular: Normal rate, regular rhythm and normal heart  sounds.   Pulmonary/Chest: Effort normal and breath sounds normal. No respiratory distress.   Abdominal: Soft. Bowel sounds are normal.   Right CVA tenderness   Musculoskeletal: Normal range of motion.   Neurological: She is alert and oriented to person, place, and time. No cranial nerve deficit.   Skin: Skin is warm and dry.   Psychiatric: She has a normal mood and affect.   Vitals reviewed.      Results Review:  I have personally reviewed most recent-CT abdomen/pelvis stone protocol-  Results from last 7 days   Lab Units 05/17/20  1145   WBC 10*3/mm3 8.00   HEMOGLOBIN g/dL 13.2   HEMATOCRIT % 37.3   PLATELETS 10*3/mm3 183     Results from last 7 days   Lab Units 05/17/20  1145 05/13/20  2018   SODIUM mmol/L 136 141   POTASSIUM mmol/L 3.5 3.3*   CHLORIDE mmol/L 101 105   CO2 mmol/L 21.0* 22.0   BUN mg/dL 16 19   CREATININE mg/dL 1.28* 0.88   GLUCOSE mg/dL 106* 111*   CALCIUM mg/dL 9.0 9.3   ALT (SGPT) U/L  --  11   AST (SGOT) U/L  --  20     Estimated Creatinine Clearance: 60.9 mL/min (A) (by C-G formula based on SCr of 1.28 mg/dL (H)).  Brief Urine Lab Results  (Last result in the past 365 days)      Color   Clarity   Blood   Leuk Est   Nitrite   Protein   CREAT   Urine HCG        05/17/20 1145 Yellow Cloudy  Comment:  Result checked  Moderate (2+) Trace Negative Negative               Microbiology Results (last 10 days)     ** No results found for the last 240 hours. **          ECG/EMG Results (most recent)     None                    Ct Abdomen Pelvis Without Contrast    Result Date: 5/13/2020  1.Numerous bilateral renal calculi with moderate right hydronephrosis and hydroureter. The ureters dilated into the pelvis, but evaluation of the distal ureters is severely limited due to artifact from patient's bilateral hip arthroplasties. Several calcifications are seen in the right pelvis, one of which is presumed to be related to an obstructing calculus given the above findings. 2.No left hydronephrosis. There is a  suspected left renal cyst. 3.Mild prominence of retroperitoneal lymph nodes, nonspecific. 4.No other definite acute abnormality is seen on this limited noncontrast study.    Electronically Signed By-DR. Kory Melgar MD On:5/13/2020 9:23 PM This report was finalized on [id number] by DR. Kory Melgar MD.        Estimated Creatinine Clearance: 60.9 mL/min (A) (by C-G formula based on SCr of 1.28 mg/dL (H)).    Assessment/Plan   Assessment/Plan       Active Hospital Problems    Diagnosis  POA   • Ureterolithiasis [N20.1]  Yes      Resolved Hospital Problems   No resolved problems to display.     Right flank pain  Due to renal calculi  CT abdomen/pelvis stone protocol showed numerous bilateral renal calculi with moderate right hydronephrosis and hydroureter.  Due to patient's previous bilateral hips arthroplasties, distal ureter could not be visualized.  Continue supportive care with IV fluid, analgesic and antiemetic.  Urologist consulted and plans cystoscopy with possible stent placements today.    Acute renal failure  Due to obstructive uropathy with dehydration  Serum creatinine 4 days ago was 0.88  Serum creatinine with current presentation 1.28  Continue on IV fluid      UTI  UA suggestive of UTI  On empirical antibiotics with Rocephin  Awaiting urine culture    Dehydration-due to poor p.o. Intake  On IV fluid      DVT prophylaxis with Lovenox    Further recommendation following clinical course          Mechanical Order History:     None      Pharmalogical Order History:     None          CODE STATUS:    There are no questions and answers to display.         I discussed the patient's findings and my recommendations with patient        Electronically signed by Ramu Jacobs MD, 05/17/20, 1:01 PM.  [facility] Hospitalist Team

## 2020-05-17 NOTE — ANESTHESIA POSTPROCEDURE EVALUATION
Patient: Lynette Bray    Procedure Summary     Date:  05/17/20 Room / Location:  [facility] OR 08 / [facility] MAIN OR    Anesthesia Start:  1507 Anesthesia Stop:  1534    Procedure:  CYSTOSCOPY URETEROSCOPY RETROGRADE PYELOGRAM HOLMIUM LASER STENT INSERTION (Right ) Diagnosis:      Surgeon:  Pio Muniz MD Provider:  Garry Toth MD    Anesthesia Type:  general, spinal ASA Status:  2 - Emergent          Anesthesia Type: general, spinal    Vitals  Vitals Value Taken Time   BP 76/34 5/17/2020  3:40 PM   Temp     Pulse 75 5/17/2020  3:40 PM   Resp     SpO2 97 % 5/17/2020  3:40 PM   Vitals shown include unvalidated device data.        Post Anesthesia Care and Evaluation    Patient location during evaluation: bedside  Patient participation: complete - patient participated  Level of consciousness: awake and alert  Pain score: 1  Pain management: adequate  Airway patency: patent  Anesthetic complications: No anesthetic complications  PONV Status: none  Cardiovascular status: acceptable  Respiratory status: acceptable  Hydration status: acceptable  Post Neuraxial Block status: Motor and sensory function returned to baseline

## 2020-05-17 NOTE — OP NOTE
CYSTOSCOPY URETEROSCOPY RETROGRADE PYELOGRAM HOLMIUM LASER STENT INSERTION  Procedure Report    Patient Name:  Lynette Bray  YOB: 1980    Date of Surgery:  5/17/2020     Indications: Right ureteral calculi    Pre-op Diagnosis:   Right ureteral calculi       Post-Op Diagnosis Codes:  Same    Procedure/CPT® Codes:      Procedure(s):  CYSTOSCOPY right URETEROSCOPY STONE EXTRACTION AND STENT INSERTION    Staff:  Surgeon(s):  Pio Muniz MD         Anesthesia: General    Estimated Blood Loss: none    Implants:    Nothing was implanted during the procedure    Specimen:          None      Findings: We sent her stones off for analysis    Complications: None    Description of Procedure: Patient was taken to the operating room and laid in the supine position where general endotracheal anesthesia was induced.  She was then placed in a comfortable dorsal lithotomy position where her groin area was prepped and draped in usual sterile fashion.  Cystoscopy was carried out finding a normal urethra normal bladder.  A guidewire was placed up the right ureter to level the kidney under fluoroscopic guidance.  And a dual-lumen catheter was placed on the second guidewire as a safety guidewire.  The dual-lumen catheter was removed and rigid ureteroscopy was care of the right ureter finding for right distal ureteral stones which were all removed with basket extraction.  Follow-up ureteroscopy revealed no other stones in the right ureter.  We placed a 6 x 24 double-J stent with a good curl in the kidney good curl in the bladder.  Patient will follow-up on Friday for a cystoscopy right stent removal and no KUB is needed      Pio Muniz MD     Date: 5/17/2020  Time: 15:36

## 2020-05-17 NOTE — NURSING NOTE
Consent forms filled out and patient has signed after extensive explanation of procedure as per Dr Muniz's request.

## 2020-05-17 NOTE — ED PROVIDER NOTES
Subjective   Patient is a 40-year-old white female with no significant medical history who presents today with complaints of right flank pain and vomiting.  She states she was seen here in the ED 4 days ago and diagnosed with kidney stone.  She states she has been unable to keep down her medications including pain medication.  She reports an increase in her right flank pain and vomiting.  She denies any fever or chills.  She denies any dysuria frequency urgency or difficulty urinating.          Review of Systems   Constitutional: Negative for chills and fever.   Gastrointestinal: Positive for nausea and vomiting. Negative for abdominal pain, blood in stool and diarrhea.   Genitourinary: Positive for flank pain. Negative for decreased urine volume, difficulty urinating, dysuria, frequency, urgency, vaginal bleeding and vaginal discharge.   Skin: Negative for rash.       No past medical history on file.    No Known Allergies    No past surgical history on file.    No family history on file.    Social History     Socioeconomic History   • Marital status:      Spouse name: Not on file   • Number of children: Not on file   • Years of education: Not on file   • Highest education level: Not on file           Objective   Physical Exam   Constitutional: She appears well-developed.    Vital signs and triage nurse note reviewed.  Constitutional: Awake, alert; well-developed and well-nourished.  Mild distress is noted due to pain.  HEENT: Normocephalic, atraumatic; pupils are PERRL with intact EOM; oropharynx is pink and moist without exudate or erythema.  No drooling or pooling of oral secretions.  Neck: Supple, full range of motion without pain; no cervical lymphadenopathy. Normal phonation.  Cardiovascular: Regular rate and rhythm, normal S1-S2.  No murmur noted.  Pulmonary: Respiratory effort regular nonlabored, breath sounds clear to auscultation all fields.  Abdomen: Soft, tender on right flank and right lower  quadrant, nondistended with normoactive bowel sounds; no rebound or guarding.  Musculoskeletal: Independent range of motion of all extremities with no palpable tenderness or edema.  Neuro: Alert oriented x3, speech is clear and appropriate, GCS 15.    Skin: Flesh tone, warm, dry, intact; no erythematous or petechial rash or lesion.        Procedures           ED Course      Labs Reviewed   BASIC METABOLIC PANEL - Abnormal; Notable for the following components:       Result Value    Glucose 106 (*)     Creatinine 1.28 (*)     CO2 21.0 (*)     eGFR Non  Amer 46 (*)     All other components within normal limits    Narrative:     GFR Normal >60  Chronic Kidney Disease <60  Kidney Failure <15     URINALYSIS W/ CULTURE IF INDICATED - Abnormal; Notable for the following components:    Appearance, UA Cloudy (*)     Ketones, UA 80 mg/dL (3+) (*)     Blood, UA Moderate (2+) (*)     Leuk Esterase, UA Trace (*)     All other components within normal limits   CBC WITH AUTO DIFFERENTIAL - Abnormal; Notable for the following components:    Neutrophil % 78.9 (*)     Lymphocyte % 10.7 (*)     All other components within normal limits   URINALYSIS, MICROSCOPIC ONLY - Abnormal; Notable for the following components:    RBC, UA 21-30 (*)     WBC, UA 13-20 (*)     Bacteria, UA Trace (*)     Squamous Epithelial Cells, UA 13-20 (*)     All other components within normal limits   URINE CULTURE   CBC AND DIFFERENTIAL    Narrative:     The following orders were created for panel order CBC & Differential.  Procedure                               Abnormality         Status                     ---------                               -----------         ------                     CBC Auto Differential[[id number]]        Abnormal            Final result                 Please view results for these tests on the individual orders.     No radiology results for the last day  Medications   sodium chloride 0.9 % flush 10 mL (has no administration  in time range)   sodium chloride 0.9 % bolus 1,000 mL (1,000 mL Intravenous New Bag 5/17/20 1145)   ketorolac (TORADOL) injection 15 mg (15 mg Intravenous Given 5/17/20 1146)   ondansetron (ZOFRAN) injection 4 mg (4 mg Intravenous Given 5/17/20 1146)   Morphine sulfate (PF) injection 4 mg (4 mg Intravenous Given 5/17/20 1146)                                          MDM  Number of Diagnoses or Management Options  Hydroureteronephrosis:   Right flank pain:   Ureterolithiasis:   Diagnosis management comments: Patient had IV established.  She had labs obtained.  She was given fluid bolus as well as morphine and Zofran for pain and nausea.  Her CT scan from 4 days ago was reviewed and noted to show moderate hydronephrosis and hydroureter as well as several calcifications in the right pelvis but evaluation was limited due to artifact from hip replacement.  CBC unremarkable today.  Patient has increase in creatinine from 0.8 on 5/13/2020 to 1.28 today.    Patient was discussed with Dr. Muniz, on-call urologist who states he will consult the patient with plans for surgery later today.  He would like the patient admitted to the hospitalist service.  Patient was discussed with the hospitalist who agreed to admit.    Diagnosis and treatment plan discussed with patient.  Patient agreeable to plan.          Amount and/or Complexity of Data Reviewed  Clinical lab tests: ordered and reviewed    Patient Progress  Patient progress: stable      Final diagnoses:   Ureterolithiasis   Hydroureteronephrosis   Right flank pain   Non-intractable vomiting with nausea, unspecified vomiting type            Bibi Magana, FABIAN  05/17/20 1246

## 2020-05-17 NOTE — ANESTHESIA POSTPROCEDURE EVALUATION
Patient: April M Asa    Procedure Summary     Date:  05/17/20 Room / Location:  [facility] OR 08 / [facility] MAIN OR    Anesthesia Start:  1507 Anesthesia Stop:  1534    Procedure:  CYSTOSCOPY URETEROSCOPY RETROGRADE PYELOGRAM HOLMIUM LASER STENT INSERTION (Right ) Diagnosis:      Surgeon:  Pio Muniz MD Provider:  Garry Toth MD    Anesthesia Type:  general, spinal ASA Status:  2 - Emergent          Anesthesia Type: general, spinal    Vitals  Vitals Value Taken Time   BP 76/34 5/17/2020  3:40 PM   Temp     Pulse 75 5/17/2020  3:40 PM   Resp     SpO2 97 % 5/17/2020  3:40 PM   Vitals shown include unvalidated device data.        Anesthesia Post Evaluation

## 2020-05-18 VITALS
HEIGHT: 65 IN | OXYGEN SATURATION: 97 % | TEMPERATURE: 98.2 F | BODY MASS INDEX: 24.5 KG/M2 | WEIGHT: 147.05 LBS | DIASTOLIC BLOOD PRESSURE: 76 MMHG | SYSTOLIC BLOOD PRESSURE: 116 MMHG | HEART RATE: 67 BPM | RESPIRATION RATE: 16 BRPM

## 2020-05-18 LAB
ANION GAP SERPL CALCULATED.3IONS-SCNC: 10 MMOL/L (ref 5–15)
BACTERIA SPEC AEROBE CULT: NO GROWTH
BASOPHILS # BLD AUTO: 0 10*3/MM3 (ref 0–0.2)
BASOPHILS NFR BLD AUTO: 0.4 % (ref 0–1.5)
BUN BLD-MCNC: 11 MG/DL (ref 6–20)
BUN/CREAT SERPL: 11.2 (ref 7–25)
CALCIUM SPEC-SCNC: 8 MG/DL (ref 8.6–10.5)
CHLORIDE SERPL-SCNC: 109 MMOL/L (ref 98–107)
CO2 SERPL-SCNC: 21 MMOL/L (ref 22–29)
CREAT BLD-MCNC: 0.98 MG/DL (ref 0.57–1)
DEPRECATED RDW RBC AUTO: 40.3 FL (ref 37–54)
EOSINOPHIL # BLD AUTO: 0.1 10*3/MM3 (ref 0–0.4)
EOSINOPHIL NFR BLD AUTO: 2.7 % (ref 0.3–6.2)
ERYTHROCYTE [DISTWIDTH] IN BLOOD BY AUTOMATED COUNT: 12.8 % (ref 12.3–15.4)
GFR SERPL CREATININE-BSD FRML MDRD: 63 ML/MIN/1.73
GLUCOSE BLD-MCNC: 93 MG/DL (ref 65–99)
HCT VFR BLD AUTO: 29.9 % (ref 34–46.6)
HGB BLD-MCNC: 10.7 G/DL (ref 12–15.9)
LYMPHOCYTES # BLD AUTO: 2 10*3/MM3 (ref 0.7–3.1)
LYMPHOCYTES NFR BLD AUTO: 39.5 % (ref 19.6–45.3)
MCH RBC QN AUTO: 32.3 PG (ref 26.6–33)
MCHC RBC AUTO-ENTMCNC: 35.8 G/DL (ref 31.5–35.7)
MCV RBC AUTO: 90.3 FL (ref 79–97)
MONOCYTES # BLD AUTO: 0.5 10*3/MM3 (ref 0.1–0.9)
MONOCYTES NFR BLD AUTO: 10.1 % (ref 5–12)
NEUTROPHILS # BLD AUTO: 2.4 10*3/MM3 (ref 1.7–7)
NEUTROPHILS NFR BLD AUTO: 47.3 % (ref 42.7–76)
NRBC BLD AUTO-RTO: 0 /100 WBC (ref 0–0.2)
PLATELET # BLD AUTO: 173 10*3/MM3 (ref 140–450)
PMV BLD AUTO: 8 FL (ref 6–12)
POTASSIUM BLD-SCNC: 3.5 MMOL/L (ref 3.5–5.2)
RBC # BLD AUTO: 3.32 10*6/MM3 (ref 3.77–5.28)
SODIUM BLD-SCNC: 140 MMOL/L (ref 136–145)
WBC NRBC COR # BLD: 5 10*3/MM3 (ref 3.4–10.8)

## 2020-05-18 PROCEDURE — 25010000002 CEFAZOLIN PER 500 MG: Performed by: UROLOGY

## 2020-05-18 PROCEDURE — 85025 COMPLETE CBC W/AUTO DIFF WBC: CPT | Performed by: UROLOGY

## 2020-05-18 PROCEDURE — G0378 HOSPITAL OBSERVATION PER HR: HCPCS

## 2020-05-18 PROCEDURE — 99217 PR OBSERVATION CARE DISCHARGE MANAGEMENT: CPT | Performed by: INTERNAL MEDICINE

## 2020-05-18 PROCEDURE — 80048 BASIC METABOLIC PNL TOTAL CA: CPT | Performed by: UROLOGY

## 2020-05-18 RX ORDER — HYDROCODONE BITARTRATE AND ACETAMINOPHEN 7.5; 325 MG/1; MG/1
1 TABLET ORAL EVERY 6 HOURS PRN
Qty: 10 TABLET | Refills: 0
Start: 2020-05-18

## 2020-05-18 RX ORDER — CIPROFLOXACIN 500 MG/1
500 TABLET, FILM COATED ORAL 2 TIMES DAILY
Qty: 20 TABLET | Refills: 0
Start: 2020-05-18 | End: 2020-05-28

## 2020-05-18 RX ADMIN — CEFAZOLIN SODIUM 2 G: 10 INJECTION, POWDER, FOR SOLUTION INTRAVENOUS at 01:05

## 2020-05-18 RX ADMIN — SUMATRIPTAN SUCCINATE 25 MG: 25 TABLET ORAL at 12:22

## 2020-05-18 RX ADMIN — CEFAZOLIN SODIUM 2 G: 10 INJECTION, POWDER, FOR SOLUTION INTRAVENOUS at 08:58

## 2020-05-18 RX ADMIN — PHENAZOPYRIDINE HYDROCHLORIDE 100 MG: 100 TABLET ORAL at 12:23

## 2020-05-18 RX ADMIN — Medication 10 ML: at 08:58

## 2020-05-18 RX ADMIN — PHENAZOPYRIDINE HYDROCHLORIDE 100 MG: 100 TABLET ORAL at 08:58

## 2020-05-18 NOTE — PLAN OF CARE
Problem: Patient Care Overview  Goal: Plan of Care Review  Outcome: Ongoing (interventions implemented as appropriate)  Flowsheets  Taken 5/18/2020 1240  Progress: improving  Outcome Summary: Pt has complained of minimal flank pain, and headache. Gave sumatriptan prn. Pt to have follow up with Dr. Muniz on Friday, discharging home this afternoon.  Taken 5/18/2020 0715  Plan of Care Reviewed With: patient

## 2020-05-18 NOTE — PROGRESS NOTES
Discharge Planning Assessment   Derek     Patient Name: Lynette Bray  MRN: [id number]  Today's Date: 5/18/2020    Admit Date: 5/17/2020    Discharge Needs Assessment     Row Name 05/18/20 0944       Living Environment    Lives With  spouse    Current Living Arrangements  home/apartment/condo    Potentially Unsafe Housing Conditions  unable to assess    Primary Care Provided by  self    Provides Primary Care For  child(josé miguel) 2 children at home    Family Caregiver if Needed  spouse    Quality of Family Relationships  unable to assess    Able to Return to Prior Arrangements  yes       Resource/Environmental Concerns    Resource/Environmental Concerns  none    Transportation Concerns  car, none       Transition Planning    Patient/Family Anticipates Transition to  home with family    Patient/Family Anticipated Services at Transition  none    Transportation Anticipated  car, drives self;family or friend will provide       Discharge Needs Assessment    Readmission Within the Last 30 Days  no previous admission in last 30 days    Concerns to be Addressed  no discharge needs identified    Equipment Currently Used at Home  none    Anticipated Changes Related to Illness  none    Provided Post Acute Provider List?  N/A    N/A Provider List Comment  patient denies any needs for discharge        Discharge Plan     Row Name 05/18/20 0946       Plan    Plan  return home    Patient/Family in Agreement with Plan  yes due to covid-19 crisis and unable to vivit patient in room i called patient in her room on the phone; she is independent at home with spouse and denies any needs; she has a primary md and does not have any dme       Carol naegele rn  Case management  Office number [phone]  Cell phone [phone]

## 2020-05-18 NOTE — PLAN OF CARE
Patient currently resting abed. She stated that her pain is better, but that it is mostly a burning pain now.

## 2020-05-18 NOTE — DISCHARGE SUMMARY
[facility] Medicine Services  DISCHARGE SUMMARY        Prepared For PCP:  Mata Quinteros MD    Patient Name: Lynette Bray  : 1980  MRN: [id number]      Date of Admission:   2020    Date of Discharge:  2020    Length of stay:  LOS: 0 days     Hospital Course     Presenting Problem:   Ureterolithiasis [N20.1]  Hydroureteronephrosis [N13.30]  Right flank pain [R10.9]  Non-intractable vomiting with nausea, unspecified vomiting type [R11.2]      Active Hospital Problems   No active problems to display.      Resolved Hospital Problems    Diagnosis Date Resolved POA   • Ureterolithiasis [N20.1] 2020 Yes           Hospital Course:  40-year-old female with medical history significant for migraine headache.  Presented to the emergency room with complaints of right flank pain.  Stated pain started all of a sudden 5 days ago.  Pain is of moderate to severe intensity-7-10 over 10 in severity.  Radiates down the right groin with no exacerbating or relieving factor.  Had associated nausea and vomiting.  Denied any fever or chills.  Also had no dysuria or increased urinary frequency.  For the above symptom, patient initially presented to the ED 4 days ago and she was diagnosed with renal stone.  She was discharged on narcotic, antibiotics, antiemetic and Flomax and told to follow-up with a urologist as outpatient.  Per patient, symptoms has worsened and she is not able to take any medication due to increased pain, nausea with vomiting thus she represented to the emergency room today.  On-call urologist was consulted from the ED and plans Cystoscopy and possible stent placement .    Conditions addressed while inpatient are as stated below    Right flank pain  Due to right renal calculi  CT abdomen/pelvis stone protocol showed numerous bilateral renal calculi with moderate right hydronephrosis and hydroureter.  Due to patient's previous bilateral hips arthroplasties, distal  ureter could not be visualized.  Status post cystoscopy, right ureteroscopy, stone extraction and stent insertion  Discharging on 10 days course of ciprofloxacin 500 mg twice daily and Norco 7.5 mg every 6 hours as needed.  Patient to follow-up with urologist on 5/22/2020 for stent removal    Acute renal failure  Due to obstructive uropathy with dehydration  Serum creatinine 4 days prior to presentation was 0.88  Serum creatinine with current presentation 1.28  Resolved with IV fluid        UTI  UA suggestive of UTI  On empirical antibiotics with Rocephin which was changed to ciprofloxacin at discharge.  Final urine culture at discharge pending     Dehydration-due to poor p.o. Intake  Improved IV fluid         Recommendation for Outpatient Providers:             Reasons For Change In Medications and Indications for New Medications:        Day of Discharge     HPI:       Vital Signs:   Temp:  [97.8 °F (36.6 °C)-98.2 °F (36.8 °C)] 98.2 °F (36.8 °C)  Heart Rate:  [63-82] 67  Resp:  [10-16] 16  BP: ()/(34-76) 116/76     Physical Exam:  Physical Exam   Constitutional: She is oriented to person, place, and time. She appears well-developed. No distress.   HENT:   Head: Normocephalic and atraumatic.   Eyes: Pupils are equal, round, and reactive to light. Conjunctivae and EOM are normal.   Neck: Neck supple.   Cardiovascular: Normal rate, regular rhythm and normal heart sounds.   Pulmonary/Chest: Effort normal and breath sounds normal. No respiratory distress.   Abdominal: Soft. Bowel sounds are normal. There is no tenderness.   Musculoskeletal: Normal range of motion.   Neurological: She is alert and oriented to person, place, and time. No cranial nerve deficit.   Skin: Skin is warm and dry.   Psychiatric: She has a normal mood and affect.   Vitals reviewed.      Pertinent  and/or Most Recent Results     Results from last 7 days   Lab Units 05/18/20  0338 05/17/20  1145 05/13/20 2018   WBC 10*3/mm3 5.00 8.00 9.70    HEMOGLOBIN g/dL 10.7* 13.2 14.3   HEMATOCRIT % 29.9* 37.3 39.8   PLATELETS 10*3/mm3 173 183 197   SODIUM mmol/L 140 136 141   POTASSIUM mmol/L 3.5 3.5 3.3*   CHLORIDE mmol/L 109* 101 105   CO2 mmol/L 21.0* 21.0* 22.0   BUN mg/dL 11 16 19   CREATININE mg/dL 0.98 1.28* 0.88   GLUCOSE mg/dL 93 106* 111*   CALCIUM mg/dL 8.0* 9.0 9.3     Results from last 7 days   Lab Units 05/13/20 2018   BILIRUBIN mg/dL 0.3   ALK PHOS U/L 70   ALT (SGPT) U/L 11   AST (SGOT) U/L 20           Invalid input(s): TG, LDLCALC, LDLREALC        Brief Urine Lab Results  (Last result in the past 365 days)      Color   Clarity   Blood   Leuk Est   Nitrite   Protein   CREAT   Urine HCG        05/17/20 1145 Yellow Cloudy  Comment:  Result checked  Moderate (2+) Trace Negative Negative               Microbiology Results Abnormal     Procedure Component Value - Date/Time    Urine Culture - Urine, Urine, Clean Catch [[id number]]  (Normal) Collected:  05/17/20 1145    Lab Status:  Preliminary result Specimen:  Urine, Clean Catch Updated:  05/18/20 0902     Urine Culture No growth          Ct Abdomen Pelvis Without Contrast    Result Date: 5/13/2020  Impression: 1.Numerous bilateral renal calculi with moderate right hydronephrosis and hydroureter. The ureters dilated into the pelvis, but evaluation of the distal ureters is severely limited due to artifact from patient's bilateral hip arthroplasties. Several calcifications are seen in the right pelvis, one of which is presumed to be related to an obstructing calculus given the above findings. 2.No left hydronephrosis. There is a suspected left renal cyst. 3.Mild prominence of retroperitoneal lymph nodes, nonspecific. 4.No other definite acute abnormality is seen on this limited noncontrast study.    Electronically Signed By-DR. Kory Melgar MD On:5/13/2020 9:23 PM This report was finalized on [id number] by DR. Kory Melgar MD.                             Test Results Pending at Discharge   Order  Current Status    STONE ANALYSIS - Calculus, Ureter, Right In process    Urine Culture - Urine, Urine, Clean Catch Preliminary result            Procedures Performed  Procedure(s):  CYSTOSCOPY, right ureteroscopy, stone extraction STENT INSERTION         Consults:   Consults     Date and Time Order Name Status Description    5/17/2020 1231 Hospitalist (on-call MD unless specified) Completed     5/17/2020 1221 Urology (on-call MD unless specified) Completed             Discharge Details        Discharge Medications      New Medications      Instructions Start Date   ciprofloxacin 500 MG tablet  Commonly known as:  Cipro   500 mg, Oral, 2 Times Daily         Changes to Medications      Instructions Start Date   HYDROcodone-acetaminophen 7.5-325 MG per tablet  Commonly known as:  NORCO  What changed:  when to take this   1 tablet, Oral, Every 6 Hours PRN         Continue These Medications      Instructions Start Date   multivitamin with minerals tablet tablet   1 tablet, Oral, Daily      ondansetron ODT 4 MG disintegrating tablet  Commonly known as:  ZOFRAN-ODT   4 mg, Translingual, 4 Times Daily PRN      SUMATRIPTAN SUCCINATE PO   Oral, Every 2 Hours PRN, Take one tablet at onset of headache. May repeat dose one time in 2 hours if headache not relieved.          Stop These Medications    cefdinir 300 MG capsule  Commonly known as:  OMNICEF     tamsulosin 0.4 MG capsule 24 hr capsule  Commonly known as:  FLOMAX            No Known Allergies      Discharge Disposition:  Home or Self Care    Diet:  Hospital:  Diet Order   Procedures   • Diet Regular         Discharge Activity:   Activity Instructions     Activity as Tolerated              CODE STATUS:    Code Status and Medical Interventions:   Ordered at: 05/17/20 4988     Level Of Support Discussed With:    Patient     Code Status:    CPR     Medical Interventions (Level of Support Prior to Arrest):    Full         Follow-up Appointments  No future  appointments.    Additional Instructions for the Follow-ups that You Need to Schedule     Discharge Follow-up with PCP   As directed       Currently Documented PCP:    Mata Quinteros MD    PCP Phone Number:    [phone]     Follow Up Details:  Follow-up with PCP as needed         Discharge Follow-up with Specified Provider: Follow-up with urologist as scheduled on Friday, 5/22/2020 for stent removal   As directed      To:  Follow-up with urologist as scheduled on Friday, 5/22/2020 for stent removal                 Condition on Discharge:      Stable          Electronically signed by Ramu Jacobs MD, 05/18/20, 8:32 AM.      Time: I spent  28  minutes on this discharge activity which included face-to-face encounter with the patient/reviewing the data in the system/coordination of the care with the nursing staff as well as consultants/documentation/entering orders.

## 2020-05-19 NOTE — PROGRESS NOTES
Discharge Planning Assessment   Derek     Patient Name: Lynette Bray  MRN: [id number]  Today's Date: 5/19/2020    Admit Date: 5/17/2020          Plan    Final Discharge Disposition Code  01 - home or self-care    Final Note  return home       Carol naegele rn  Case management  Office number [phone]  Cell phone [phone]

## 2020-05-27 LAB
COD CRY STONE QL IR: 30 %
COLOR STONE: NORMAL
COM CRY STONE QL IR: 50 %
COMPN STONE: NORMAL
HYDROXYAPATITE: 20 %
LABORATORY COMMENT REPORT: NORMAL
Lab: NORMAL
Lab: NORMAL
PHOTO: NORMAL
SIZE STONE: NORMAL MM
SPECIMEN SOURCE: NORMAL
WT STONE: 45 MG

## 2020-07-28 ENCOUNTER — TRANSCRIBE ORDERS (OUTPATIENT)
Dept: ADMINISTRATIVE | Facility: HOSPITAL | Age: 40
End: 2020-07-28

## 2020-07-28 ENCOUNTER — LAB (OUTPATIENT)
Dept: LAB | Facility: HOSPITAL | Age: 40
End: 2020-07-28

## 2020-07-28 ENCOUNTER — HOSPITAL ENCOUNTER (OUTPATIENT)
Dept: CARDIOLOGY | Facility: HOSPITAL | Age: 40
Discharge: HOME | End: 2020-07-28
Admitting: UROLOGY

## 2020-07-28 DIAGNOSIS — Z01.818 PRE-OP TESTING: Primary | ICD-10-CM

## 2020-07-28 DIAGNOSIS — Z01.818 PRE-OP TESTING: ICD-10-CM

## 2020-07-28 PROCEDURE — 36415 COLL VENOUS BLD VENIPUNCTURE: CPT

## 2020-07-28 PROCEDURE — 80048 BASIC METABOLIC PNL TOTAL CA: CPT

## 2020-07-28 PROCEDURE — 93005 ELECTROCARDIOGRAM TRACING: CPT | Performed by: UROLOGY

## 2020-07-28 PROCEDURE — 85025 COMPLETE CBC W/AUTO DIFF WBC: CPT

## 2020-07-29 LAB
ANION GAP SERPL CALCULATED.3IONS-SCNC: 8.5 MMOL/L (ref 5–15)
BASOPHILS # BLD AUTO: 0.04 10*3/MM3 (ref 0–0.2)
BASOPHILS NFR BLD AUTO: 0.6 % (ref 0–1.5)
BUN SERPL-MCNC: 13 MG/DL (ref 6–20)
BUN/CREAT SERPL: 13.3 (ref 7–25)
CALCIUM SPEC-SCNC: 9.8 MG/DL (ref 8.6–10.5)
CHLORIDE SERPL-SCNC: 103 MMOL/L (ref 98–107)
CO2 SERPL-SCNC: 27.5 MMOL/L (ref 22–29)
CREAT SERPL-MCNC: 0.98 MG/DL (ref 0.57–1)
DEPRECATED RDW RBC AUTO: 42.2 FL (ref 37–54)
EOSINOPHIL # BLD AUTO: 0.1 10*3/MM3 (ref 0–0.4)
EOSINOPHIL NFR BLD AUTO: 1.4 % (ref 0.3–6.2)
ERYTHROCYTE [DISTWIDTH] IN BLOOD BY AUTOMATED COUNT: 12 % (ref 12.3–15.4)
GFR SERPL CREATININE-BSD FRML MDRD: 63 ML/MIN/1.73
GLUCOSE SERPL-MCNC: 82 MG/DL (ref 65–99)
HCT VFR BLD AUTO: 43.1 % (ref 34–46.6)
HGB BLD-MCNC: 13.8 G/DL (ref 12–15.9)
IMM GRANULOCYTES # BLD AUTO: 0.01 10*3/MM3 (ref 0–0.05)
IMM GRANULOCYTES NFR BLD AUTO: 0.1 % (ref 0–0.5)
LYMPHOCYTES # BLD AUTO: 2.22 10*3/MM3 (ref 0.7–3.1)
LYMPHOCYTES NFR BLD AUTO: 31.3 % (ref 19.6–45.3)
MCH RBC QN AUTO: 30.6 PG (ref 26.6–33)
MCHC RBC AUTO-ENTMCNC: 32 G/DL (ref 31.5–35.7)
MCV RBC AUTO: 95.6 FL (ref 79–97)
MONOCYTES # BLD AUTO: 0.5 10*3/MM3 (ref 0.1–0.9)
MONOCYTES NFR BLD AUTO: 7 % (ref 5–12)
NEUTROPHILS NFR BLD AUTO: 4.23 10*3/MM3 (ref 1.7–7)
NEUTROPHILS NFR BLD AUTO: 59.6 % (ref 42.7–76)
NRBC BLD AUTO-RTO: 0 /100 WBC (ref 0–0.2)
PLATELET # BLD AUTO: 280 10*3/MM3 (ref 140–450)
PMV BLD AUTO: 10.3 FL (ref 6–12)
POTASSIUM SERPL-SCNC: 3.8 MMOL/L (ref 3.5–5.2)
RBC # BLD AUTO: 4.51 10*6/MM3 (ref 3.77–5.28)
SODIUM SERPL-SCNC: 139 MMOL/L (ref 136–145)
WBC # BLD AUTO: 7.1 10*3/MM3 (ref 3.4–10.8)

## 2020-08-11 ENCOUNTER — TRANSCRIBE ORDERS (OUTPATIENT)
Dept: ADMINISTRATIVE | Facility: HOSPITAL | Age: 40
End: 2020-08-11

## 2020-08-11 ENCOUNTER — HOSPITAL ENCOUNTER (OUTPATIENT)
Dept: GENERAL RADIOLOGY | Facility: HOSPITAL | Age: 40
Discharge: HOME | End: 2020-08-11
Admitting: UROLOGY

## 2020-08-11 DIAGNOSIS — N20.0 RENAL CALCULUS: ICD-10-CM

## 2020-08-11 DIAGNOSIS — N20.0 RENAL CALCULUS: Primary | ICD-10-CM

## 2020-08-11 PROCEDURE — 74018 RADEX ABDOMEN 1 VIEW: CPT

## 2020-08-29 PROCEDURE — 93010 ELECTROCARDIOGRAM REPORT: CPT | Performed by: INTERNAL MEDICINE

## 2021-10-03 ENCOUNTER — APPOINTMENT (OUTPATIENT)
Dept: GENERAL RADIOLOGY | Facility: HOSPITAL | Age: 41
End: 2021-10-03

## 2021-10-03 ENCOUNTER — HOSPITAL ENCOUNTER (EMERGENCY)
Facility: HOSPITAL | Age: 41
Discharge: HOME | End: 2021-10-03
Admitting: EMERGENCY MEDICINE

## 2021-10-03 VITALS
DIASTOLIC BLOOD PRESSURE: 96 MMHG | SYSTOLIC BLOOD PRESSURE: 131 MMHG | OXYGEN SATURATION: 100 % | HEART RATE: 84 BPM | HEIGHT: 64 IN | WEIGHT: 162.26 LBS | RESPIRATION RATE: 16 BRPM | TEMPERATURE: 98 F | BODY MASS INDEX: 27.7 KG/M2

## 2021-10-03 DIAGNOSIS — S61.210A LACERATION OF RIGHT INDEX FINGER WITHOUT FOREIGN BODY WITHOUT DAMAGE TO NAIL, INITIAL ENCOUNTER: Primary | ICD-10-CM

## 2021-10-03 PROCEDURE — 25010000002 TDAP 5-2.5-18.5 LF-MCG/0.5 SUSPENSION: Performed by: NURSE PRACTITIONER

## 2021-10-03 PROCEDURE — 99282 EMERGENCY DEPT VISIT SF MDM: CPT

## 2021-10-03 PROCEDURE — 90715 TDAP VACCINE 7 YRS/> IM: CPT | Performed by: NURSE PRACTITIONER

## 2021-10-03 PROCEDURE — 90471 IMMUNIZATION ADMIN: CPT | Performed by: NURSE PRACTITIONER

## 2021-10-03 PROCEDURE — 73130 X-RAY EXAM OF HAND: CPT

## 2021-10-03 RX ORDER — CEPHALEXIN 500 MG/1
500 CAPSULE ORAL 2 TIMES DAILY
Qty: 14 CAPSULE | Refills: 0 | Status: SHIPPED | OUTPATIENT
Start: 2021-10-03

## 2021-10-03 RX ORDER — LIDOCAINE AND PRILOCAINE 25; 25 MG/G; MG/G
1 CREAM TOPICAL ONCE
Status: COMPLETED | OUTPATIENT
Start: 2021-10-03 | End: 2021-10-03

## 2021-10-03 RX ADMIN — TETANUS TOXOID, REDUCED DIPHTHERIA TOXOID AND ACELLULAR PERTUSSIS VACCINE, ADSORBED 0.5 ML: 5; 2.5; 8; 8; 2.5 SUSPENSION INTRAMUSCULAR at 21:02

## 2021-10-03 RX ADMIN — LIDOCAINE AND PRILOCAINE 1 APPLICATION: 25; 25 CREAM TOPICAL at 19:43

## 2021-10-03 NOTE — ED PROVIDER NOTES
Subjective    Chief Complaint   Patient presents with   • Finger Laceration     c/o laceration to right index finger no thinners last tetanus unknown     Mata Quinteros MD  No LMP recorded. Patient has had an implant.  No Known Allergies  COVID status:     History of Present Illness  Patient is a 41-year-old female presents emergency department with complaint of a laceration to her right index finger.  Patient reports this began just prior to arrival.  She reports she cut her finger on a blade of a  which she was washing.  Patient is right-handed.  She denies any numbness or tingling.  No weakness.  Unsure of her last tetanus immunization.  Review of Systems   Constitutional: Negative for chills and fever.   Skin: Positive for wound (right index finger).       History reviewed. No pertinent past medical history.    No Known Allergies    Past Surgical History:   Procedure Laterality Date   • CYSTOSCOPY, URETEROSCOPY, RETROGRADE PYELOGRAM, STENT INSERTION Right 5/17/2020    Procedure: CYSTOSCOPY, right ureteroscopy, stone extraction STENT INSERTION;  Surgeon: Pio Muniz MD;  Location: [facility];  Service: Urology;  Laterality: Right;       History reviewed. No pertinent family history.    Social History     Socioeconomic History   • Marital status:      Spouse name: Not on file   • Number of children: Not on file   • Years of education: Not on file   • Highest education level: Not on file   Tobacco Use   • Smoking status: Never Smoker   • Smokeless tobacco: Never Used           Objective   Physical Exam  Vitals and nursing note reviewed.   Constitutional:       Appearance: Normal appearance.   HENT:      Head: Normocephalic and atraumatic.      Nose: Nose normal.      Mouth/Throat:      Mouth: Mucous membranes are moist.      Pharynx: Oropharynx is clear.   Musculoskeletal:      Cervical back: Normal range of motion and neck supple.      Comments: Patient has superficial laceration noted  to her right index finger.  Distal lateral aspect.  No visible tendon injury is noted.  No weakness is appreciated.  Two-point determination intact.  No numbness.  Cap refill brisk.  Right Radial pulse 2+.   Skin:     General: Skin is warm and dry.      Capillary Refill: Capillary refill takes less than 2 seconds.   Neurological:      General: No focal deficit present.      Mental Status: She is alert.   Psychiatric:         Mood and Affect: Mood normal.         Behavior: Behavior normal.         Laceration Repair    Date/Time: 10/3/2021 8:26 PM  Performed by: Dominga Orellana APRN  Authorized by: Dominga Orellana APRN     Consent:     Consent obtained:  Verbal    Consent given by:  Patient    Risks discussed:  Infection, pain, poor cosmetic result, poor wound healing, vascular damage, nerve damage, need for additional repair, retained foreign body and tendon damage    Alternatives discussed:  No treatment and observation  Anesthesia (see MAR for exact dosages):     Anesthesia method:  Local infiltration and topical application    Topical anesthetic:  EMLA cream    Local anesthetic:  Lidocaine 2% w/o epi  Laceration details:     Length (cm):  2  Repair type:     Repair type:  Simple  Pre-procedure details:     Preparation:  Patient was prepped and draped in usual sterile fashion and imaging obtained to evaluate for foreign bodies  Exploration:     Hemostasis achieved with:  Direct pressure    Wound exploration: wound explored through full range of motion and entire depth of wound probed and visualized      Wound extent: no nerve damage noted and no tendon damage noted    Treatment:     Area cleansed with:  Saline and Betadine    Amount of cleaning:  Standard    Irrigation solution:  Sterile saline  Skin repair:     Repair method:  Sutures    Suture size:  5-0    Suture material:  Nylon    Suture technique:  Simple interrupted    Number of sutures:  4  Approximation:     Approximation:  Close  Post-procedure  "details:     Dressing:  Splint for protection    Patient tolerance of procedure:  Tolerated well, no immediate complications               ED Course      /96 (BP Location: Left arm, Patient Position: Sitting)   Pulse 84   Temp 98 °F (36.7 °C) (Temporal)   Resp 16   Ht 162.6 cm (64\")   Wt 73.6 kg (162 lb 4.1 oz)   SpO2 100%   BMI 27.85 kg/m²   Labs Reviewed - No data to display  Medications   lidocaine-prilocaine (EMLA) 2.5-2.5 % cream 1 application (1 application Topical Given 10/3/21 1943)   Tdap (BOOSTRIX) injection 0.5 mL (0.5 mL Intramuscular Given 10/3/21 2102)     XR Hand 3+ View Right    Result Date: 10/3/2021  Deformity of the soft tissues of the right index finger consistent with a laceration. There is no acute fracture or radiopaque foreign body.  Electronically Signed By-Pedro Payne MD On:10/3/2021 8:03 PM This report was finalized on [id number] by  Pedro Payne MD.                                         MDM  Patient is a 41-year-old female presents emergency department with a laceration to her right index anger.  She underwent the above exam and work-up.  Patient's neurovascular intact.  No evidence for tendon injury.  No numbness or tingling.  Laceration repaired here in the ED.  Please see procedure note.  Patient be started on Keflex, and advised follow-up with suture removal.  Final diagnoses:   Laceration of right index finger without foreign body without damage to nail, initial encounter       ED Disposition  ED Disposition     ED Disposition Condition Comment    Discharge Stable           Mata Quinteros MD  [facility] [place] [id number] [address] [address] [phone]    Schedule an appointment as soon as possible for a visit       [facility] EMERGENCY DEPARTMENT  [facility] [address] [phone]    As needed, If symptoms worsen    PATIENT CONNECTION - [facility] [address] [phone]  Schedule an " appointment as soon as possible for a visit   Call for assistance with follow up with Primary care provider-call tomorrow.         Medication List      New Prescriptions    cephalexin 500 MG capsule  Commonly known as: KEFLEX  Take 1 capsule by mouth 2 (Two) Times a Day.           Where to Get Your Medications      These medications were sent to [id number] - [place], [place] - [address] - [phone]  - [phone] FX  [address] [place] [address]    Phone: [phone]   · cephalexin 500 MG capsule          Dominga Orellana, APRN  10/04/21 0026

## 2021-10-04 NOTE — DISCHARGE INSTRUCTIONS
Please follow-up with your primary care provider, call tomorrow for an appointment, if you do not have a primary care provider, please use the patient connection above to us to help establish care, please call as soon as possible.    Return the emergency department for new or worsening symptoms    Take medications as prescribed, any changes will be noted on your discharge instruction    Keep splint in place until to limit finger movement    Sutures should be removed in 7 days

## 2022-12-06 ENCOUNTER — LAB (OUTPATIENT)
Dept: LAB | Facility: HOSPITAL | Age: 42
End: 2022-12-06

## 2022-12-06 ENCOUNTER — HOSPITAL ENCOUNTER (OUTPATIENT)
Dept: CARDIOLOGY | Facility: HOSPITAL | Age: 42
Discharge: HOME | End: 2022-12-06

## 2022-12-06 ENCOUNTER — HOSPITAL ENCOUNTER (OUTPATIENT)
Dept: GENERAL RADIOLOGY | Facility: HOSPITAL | Age: 42
Discharge: HOME | End: 2022-12-06

## 2022-12-06 ENCOUNTER — TRANSCRIBE ORDERS (OUTPATIENT)
Dept: ADMINISTRATIVE | Facility: HOSPITAL | Age: 42
End: 2022-12-06

## 2022-12-06 DIAGNOSIS — N92.0 EXCESSIVE OR FREQUENT MENSTRUATION: ICD-10-CM

## 2022-12-06 DIAGNOSIS — Z01.818 PREOPERATIVE TESTING: ICD-10-CM

## 2022-12-06 DIAGNOSIS — N92.0 EXCESSIVE OR FREQUENT MENSTRUATION: Primary | ICD-10-CM

## 2022-12-06 LAB
ABO GROUP BLD: NORMAL
BLD GP AB SCN SERPL QL: NEGATIVE
RH BLD: POSITIVE
T&S EXPIRATION DATE: NORMAL

## 2022-12-06 PROCEDURE — 86901 BLOOD TYPING SEROLOGIC RH(D): CPT

## 2022-12-06 PROCEDURE — 93005 ELECTROCARDIOGRAM TRACING: CPT | Performed by: ANESTHESIOLOGY

## 2022-12-06 PROCEDURE — 86850 RBC ANTIBODY SCREEN: CPT

## 2022-12-06 PROCEDURE — 85025 COMPLETE CBC W/AUTO DIFF WBC: CPT

## 2022-12-06 PROCEDURE — 71046 X-RAY EXAM CHEST 2 VIEWS: CPT

## 2022-12-06 PROCEDURE — 86900 BLOOD TYPING SEROLOGIC ABO: CPT

## 2022-12-06 PROCEDURE — 93010 ELECTROCARDIOGRAM REPORT: CPT | Performed by: INTERNAL MEDICINE

## 2022-12-06 PROCEDURE — 84702 CHORIONIC GONADOTROPIN TEST: CPT

## 2022-12-06 PROCEDURE — 36415 COLL VENOUS BLD VENIPUNCTURE: CPT

## 2022-12-06 PROCEDURE — 80048 BASIC METABOLIC PNL TOTAL CA: CPT

## 2022-12-07 LAB
ANION GAP SERPL CALCULATED.3IONS-SCNC: 9.1 MMOL/L (ref 5–15)
BASOPHILS # BLD AUTO: 0.04 10*3/MM3 (ref 0–0.2)
BASOPHILS NFR BLD AUTO: 0.8 % (ref 0–1.5)
BUN SERPL-MCNC: 18 MG/DL (ref 6–20)
BUN/CREAT SERPL: 22.5 (ref 7–25)
CALCIUM SPEC-SCNC: 9 MG/DL (ref 8.6–10.5)
CHLORIDE SERPL-SCNC: 105 MMOL/L (ref 98–107)
CO2 SERPL-SCNC: 25.9 MMOL/L (ref 22–29)
CREAT SERPL-MCNC: 0.8 MG/DL (ref 0.57–1)
DEPRECATED RDW RBC AUTO: 40.3 FL (ref 37–54)
EGFRCR SERPLBLD CKD-EPI 2021: 93.9 ML/MIN/1.73
EOSINOPHIL # BLD AUTO: 0.19 10*3/MM3 (ref 0–0.4)
EOSINOPHIL NFR BLD AUTO: 3.7 % (ref 0.3–6.2)
ERYTHROCYTE [DISTWIDTH] IN BLOOD BY AUTOMATED COUNT: 12 % (ref 12.3–15.4)
GLUCOSE SERPL-MCNC: 91 MG/DL (ref 65–99)
HCG INTACT+B SERPL-ACNC: <1 MIU/ML
HCT VFR BLD AUTO: 36.8 % (ref 34–46.6)
HGB BLD-MCNC: 12.4 G/DL (ref 12–15.9)
IMM GRANULOCYTES # BLD AUTO: 0.01 10*3/MM3 (ref 0–0.05)
IMM GRANULOCYTES NFR BLD AUTO: 0.2 % (ref 0–0.5)
LYMPHOCYTES # BLD AUTO: 1.73 10*3/MM3 (ref 0.7–3.1)
LYMPHOCYTES NFR BLD AUTO: 33.4 % (ref 19.6–45.3)
MCH RBC QN AUTO: 30.9 PG (ref 26.6–33)
MCHC RBC AUTO-ENTMCNC: 33.7 G/DL (ref 31.5–35.7)
MCV RBC AUTO: 91.8 FL (ref 79–97)
MONOCYTES # BLD AUTO: 0.37 10*3/MM3 (ref 0.1–0.9)
MONOCYTES NFR BLD AUTO: 7.1 % (ref 5–12)
NEUTROPHILS NFR BLD AUTO: 2.84 10*3/MM3 (ref 1.7–7)
NEUTROPHILS NFR BLD AUTO: 54.8 % (ref 42.7–76)
NRBC BLD AUTO-RTO: 0 /100 WBC (ref 0–0.2)
PLATELET # BLD AUTO: 216 10*3/MM3 (ref 140–450)
PMV BLD AUTO: 9.8 FL (ref 6–12)
POTASSIUM SERPL-SCNC: 3.9 MMOL/L (ref 3.5–5.2)
QT INTERVAL: 356 MS
RBC # BLD AUTO: 4.01 10*6/MM3 (ref 3.77–5.28)
SODIUM SERPL-SCNC: 140 MMOL/L (ref 136–145)
WBC NRBC COR # BLD: 5.18 10*3/MM3 (ref 3.4–10.8)

## 2022-12-12 ENCOUNTER — LAB REQUISITION (OUTPATIENT)
Dept: LAB | Facility: HOSPITAL | Age: 42
End: 2022-12-12

## 2022-12-12 DIAGNOSIS — N92.0 EXCESSIVE AND FREQUENT MENSTRUATION WITH REGULAR CYCLE: ICD-10-CM

## 2022-12-12 PROCEDURE — 88307 TISSUE EXAM BY PATHOLOGIST: CPT | Performed by: OBSTETRICS & GYNECOLOGY

## 2022-12-13 LAB
LAB AP CASE REPORT: NORMAL
PATH REPORT.FINAL DX SPEC: NORMAL
PATH REPORT.GROSS SPEC: NORMAL

## 2025-03-07 ENCOUNTER — OFFICE (OUTPATIENT)
Dept: URBAN - METROPOLITAN AREA CLINIC 64 | Facility: CLINIC | Age: 45
End: 2025-03-07
Payer: COMMERCIAL

## 2025-03-07 VITALS
SYSTOLIC BLOOD PRESSURE: 119 MMHG | HEIGHT: 64 IN | WEIGHT: 165 LBS | DIASTOLIC BLOOD PRESSURE: 77 MMHG | HEART RATE: 65 BPM

## 2025-03-07 DIAGNOSIS — Z80.0 FAMILY HISTORY OF MALIGNANT NEOPLASM OF DIGESTIVE ORGANS: ICD-10-CM

## 2025-03-07 DIAGNOSIS — Z12.11 ENCOUNTER FOR SCREENING FOR MALIGNANT NEOPLASM OF COLON: ICD-10-CM

## 2025-03-07 DIAGNOSIS — Z83.719 FAMILY HISTORY OF COLON POLYPS, UNSPECIFIED: ICD-10-CM

## 2025-03-07 PROCEDURE — 99203 OFFICE O/P NEW LOW 30 MIN: CPT | Performed by: NURSE PRACTITIONER

## 2025-04-04 ENCOUNTER — ON CAMPUS - OUTPATIENT (OUTPATIENT)
Dept: URBAN - METROPOLITAN AREA HOSPITAL 2 | Facility: HOSPITAL | Age: 45
End: 2025-04-04
Payer: COMMERCIAL

## 2025-04-04 VITALS
HEART RATE: 81 BPM | RESPIRATION RATE: 18 BRPM | HEART RATE: 88 BPM | HEART RATE: 91 BPM | HEART RATE: 83 BPM | OXYGEN SATURATION: 100 % | DIASTOLIC BLOOD PRESSURE: 115 MMHG | DIASTOLIC BLOOD PRESSURE: 66 MMHG | WEIGHT: 162 LBS | HEART RATE: 84 BPM | SYSTOLIC BLOOD PRESSURE: 137 MMHG | RESPIRATION RATE: 16 BRPM | HEART RATE: 72 BPM | DIASTOLIC BLOOD PRESSURE: 59 MMHG | OXYGEN SATURATION: 98 % | DIASTOLIC BLOOD PRESSURE: 73 MMHG | OXYGEN SATURATION: 99 % | DIASTOLIC BLOOD PRESSURE: 93 MMHG | HEART RATE: 107 BPM | DIASTOLIC BLOOD PRESSURE: 62 MMHG | RESPIRATION RATE: 17 BRPM | SYSTOLIC BLOOD PRESSURE: 101 MMHG | HEART RATE: 105 BPM | SYSTOLIC BLOOD PRESSURE: 139 MMHG | TEMPERATURE: 98.2 F | SYSTOLIC BLOOD PRESSURE: 108 MMHG | HEIGHT: 64 IN | SYSTOLIC BLOOD PRESSURE: 123 MMHG | RESPIRATION RATE: 20 BRPM | SYSTOLIC BLOOD PRESSURE: 109 MMHG | RESPIRATION RATE: 13 BRPM | RESPIRATION RATE: 15 BRPM | HEART RATE: 82 BPM | DIASTOLIC BLOOD PRESSURE: 92 MMHG | SYSTOLIC BLOOD PRESSURE: 90 MMHG | SYSTOLIC BLOOD PRESSURE: 154 MMHG | DIASTOLIC BLOOD PRESSURE: 72 MMHG | SYSTOLIC BLOOD PRESSURE: 144 MMHG | OXYGEN SATURATION: 97 %

## 2025-04-04 DIAGNOSIS — Z12.11 ENCOUNTER FOR SCREENING FOR MALIGNANT NEOPLASM OF COLON: ICD-10-CM

## 2025-04-04 DIAGNOSIS — Z83.719 FAMILY HISTORY OF COLON POLYPS, UNSPECIFIED: ICD-10-CM

## 2025-04-04 PROCEDURE — G0105 COLORECTAL SCRN; HI RISK IND: HCPCS | Performed by: INTERNAL MEDICINE

## 2025-04-04 RX ADMIN — ONDANSETRON HYDROCHLORIDE 4 MG: 4 SOLUTION ORAL at 13:47

## (undated) DEVICE — SOL IRRG H2O BG 3000ML STRL

## (undated) DEVICE — PK PROC TURNOVER

## (undated) DEVICE — GLV SURG SIGNATURE ESSENTIAL PF LTX SZ7.5

## (undated) DEVICE — NITINOL WIRE WITH HYDROPHILIC TIP: Brand: SENSOR

## (undated) DEVICE — PK CYSTO 50

## (undated) DEVICE — NITINOL STONE RETRIEVAL BASKET: Brand: ZERO TIP

## (undated) DEVICE — GW FIX CORE JB FLX PTFE .035 15X145CM

## (undated) DEVICE — SYS IRR PUMP SGL ACTN VAC SYR 10CC

## (undated) DEVICE — SOL IRRIG H2O 1000ML STRL

## (undated) DEVICE — DUAL LUMEN URETERAL CATHETER